# Patient Record
Sex: FEMALE | Race: WHITE | NOT HISPANIC OR LATINO | Employment: PART TIME | URBAN - METROPOLITAN AREA
[De-identification: names, ages, dates, MRNs, and addresses within clinical notes are randomized per-mention and may not be internally consistent; named-entity substitution may affect disease eponyms.]

---

## 2022-04-07 ENCOUNTER — HISTORICAL (OUTPATIENT)
Dept: ADMINISTRATIVE | Facility: HOSPITAL | Age: 62
End: 2022-04-07

## 2022-04-24 VITALS
SYSTOLIC BLOOD PRESSURE: 114 MMHG | HEIGHT: 66 IN | DIASTOLIC BLOOD PRESSURE: 80 MMHG | BODY MASS INDEX: 28.16 KG/M2 | OXYGEN SATURATION: 100 % | WEIGHT: 175.25 LBS

## 2022-05-11 ENCOUNTER — TELEPHONE (OUTPATIENT)
Dept: GYNECOLOGIC ONCOLOGY | Facility: CLINIC | Age: 62
End: 2022-05-11
Payer: MEDICAID

## 2022-05-11 DIAGNOSIS — N94.12 DEEP DYSPAREUNIA IN FEMALE: ICD-10-CM

## 2022-05-11 DIAGNOSIS — L90.0 LICHEN SCLEROSUS ET ATROPHICUS: Primary | ICD-10-CM

## 2022-05-11 NOTE — NURSING
New referral received from Dr aries Sandoval to rule out vulvar cancer in a patient with Lichen sclerosus et atrophicus. Pt called and name and  verified. Explained my role as nurse navigator and direct number provided. Pt scheduled to see Dr Kelly in the next available [place] appointment. Patient encouraged to call for any questions or concerns about her care or appointments. Pt verbalized understanding. Date time and location of appointment reviewed with pt. Appointment letter mailed to pt.     Oncology Navigation   Intake  Cancer Type: Gynecologic (Lichen Sclerosus et atrophicus, rule out vulvar cancer)  Internal / External Referral: External  Referral Source: Dr aries Sandoval  Date of Referral: 5/10/2022  Initial Nurse Navigator Contact: 2022  Referral to Initial Contact Timeline (days): 1  Date Worked: 2022  First Appointment Available: 2022  Appointment Date: 2022  First Available Date vs. Scheduled Date (days): 26  Reason if booked > 7 days after scheduling: Other  Other reason booked > 7 days: pt desires to be seen in [place]. pt scheudled in next available [place] appointment.     Treatment             Acuity      Follow Up  No follow-ups on file.

## 2022-06-06 ENCOUNTER — OFFICE VISIT (OUTPATIENT)
Dept: GYNECOLOGIC ONCOLOGY | Facility: CLINIC | Age: 62
End: 2022-06-06
Payer: MEDICAID

## 2022-06-06 VITALS
HEIGHT: 65 IN | DIASTOLIC BLOOD PRESSURE: 75 MMHG | HEART RATE: 86 BPM | WEIGHT: 184.31 LBS | BODY MASS INDEX: 30.71 KG/M2 | SYSTOLIC BLOOD PRESSURE: 131 MMHG

## 2022-06-06 DIAGNOSIS — N94.12 DEEP DYSPAREUNIA IN FEMALE: ICD-10-CM

## 2022-06-06 DIAGNOSIS — L90.0 LICHEN SCLEROSUS ET ATROPHICUS: ICD-10-CM

## 2022-06-06 PROCEDURE — 99204 PR OFFICE/OUTPT VISIT, NEW, LEVL IV, 45-59 MIN: ICD-10-PCS | Mod: S$GLB,,, | Performed by: OBSTETRICS & GYNECOLOGY

## 2022-06-06 PROCEDURE — 1159F PR MEDICATION LIST DOCUMENTED IN MEDICAL RECORD: ICD-10-PCS | Mod: CPTII,S$GLB,, | Performed by: OBSTETRICS & GYNECOLOGY

## 2022-06-06 PROCEDURE — 3078F PR MOST RECENT DIASTOLIC BLOOD PRESSURE < 80 MM HG: ICD-10-PCS | Mod: CPTII,S$GLB,, | Performed by: OBSTETRICS & GYNECOLOGY

## 2022-06-06 PROCEDURE — 3075F SYST BP GE 130 - 139MM HG: CPT | Mod: CPTII,S$GLB,, | Performed by: OBSTETRICS & GYNECOLOGY

## 2022-06-06 PROCEDURE — 3078F DIAST BP <80 MM HG: CPT | Mod: CPTII,S$GLB,, | Performed by: OBSTETRICS & GYNECOLOGY

## 2022-06-06 PROCEDURE — 3008F PR BODY MASS INDEX (BMI) DOCUMENTED: ICD-10-PCS | Mod: CPTII,S$GLB,, | Performed by: OBSTETRICS & GYNECOLOGY

## 2022-06-06 PROCEDURE — 1159F MED LIST DOCD IN RCRD: CPT | Mod: CPTII,S$GLB,, | Performed by: OBSTETRICS & GYNECOLOGY

## 2022-06-06 PROCEDURE — 3075F PR MOST RECENT SYSTOLIC BLOOD PRESS GE 130-139MM HG: ICD-10-PCS | Mod: CPTII,S$GLB,, | Performed by: OBSTETRICS & GYNECOLOGY

## 2022-06-06 PROCEDURE — 3008F BODY MASS INDEX DOCD: CPT | Mod: CPTII,S$GLB,, | Performed by: OBSTETRICS & GYNECOLOGY

## 2022-06-06 PROCEDURE — 99204 OFFICE O/P NEW MOD 45 MIN: CPT | Mod: S$GLB,,, | Performed by: OBSTETRICS & GYNECOLOGY

## 2022-06-06 RX ORDER — IBUPROFEN 800 MG/1
800 TABLET ORAL 3 TIMES DAILY
COMMUNITY

## 2022-06-06 RX ORDER — CLOBETASOL PROPIONATE 0.5 MG/G
OINTMENT TOPICAL
Qty: 30 G | Refills: 3 | Status: SHIPPED | OUTPATIENT
Start: 2022-06-06

## 2022-06-06 RX ORDER — CLOBETASOL PROPIONATE 0.5 MG/G
OINTMENT TOPICAL
Qty: 30 G | Refills: 3 | Status: CANCELLED | OUTPATIENT
Start: 2022-06-06

## 2022-06-06 NOTE — PROGRESS NOTES
"REFERRING PROVIDER  Emeka Sandoval MD     HISTORY OF PRESENT CONDITION  CC: Lichen sclerosis  Mari Galvan is a 62 y.o.  with severe lichen sclerosus.  She states that it has gotten worse over the past year with paresthesias (pins and needles) and pruritis.  She also feels like her vagina is closing off, unable to have intercourse since her laser treatment 10/20/2021.  She has a h/o LORI/BSO.     2022 - Received records that indicate patient seen 2020 for annual exam with concern for "itching and odor - no relief with monistat; intense itching at labia".  She was treated with twice daily clobetasol for 2 weeks and returned for biopsy detailed below.  Last visit note from 10/2/2020 when she was directed to continue clobetasol and estrace.    DATA REVIEWED  2020 Vulvar Biopsy:  Lichenoid dermatitis with features worrisome for lichen sclerosis et atrophicus    2022  - 4;  CEA - 0.9    Past Medical History:   Diagnosis Date    Thyroid disease       Current Outpatient Medications   Medication Sig    ibuprofen (ADVIL,MOTRIN) 800 MG tablet Take 800 mg by mouth 3 (three) times daily.    clobetasol 0.05% (TEMOVATE) 0.05 % Oint Apply  1/2 fingertip (measured from fingertip to first crease below the fingertip) in a thin layer over the affected area.  Application at night for four weeks, then every other night for four weeks, then twice weekly for four weeks.     No current facility-administered medications for this visit.     Review of patient's allergies indicates:   Allergen Reactions    Prednisone      Jitters    Shellfish derived Itching     Other reaction(s): Rash       Past Surgical History:   Procedure Laterality Date    CHOLECYSTECTOMY      HYSTERECTOMY      Vulvar laser          FAMILY HISTORY  Family History   Problem Relation Age of Onset    Heart disease Father     Cancer Mother      SOCIAL HISTORY    reports that she has been smoking cigarettes. She has been smoking " about 0.50 packs per day. She has never used smokeless tobacco. She reports previous drug use. Drug: Marijuana. She reports that she does not drink alcohol.    OBJECTIVE   Vitals:    06/06/22 1453   BP: 131/75   Pulse: 86      Body mass index is 30.67 kg/m².     Physical Exam:   Constitutional: She is oriented to person, place, and time. She appears well-developed and well-nourished. No distress.    HENT:   Head: Normocephalic and atraumatic.    Eyes: Conjunctivae and EOM are normal.      Pulmonary/Chest: Effort normal. No respiratory distress.        Abdominal: Soft.     Genitourinary:                     Neurological: She is alert and oriented to person, place, and time.    Skin: No rash noted.    Psychiatric: She has a normal mood and affect. Her behavior is normal. Judgment normal.     ECOG status: 0    LABORATORY DATA  Lab data reviewed.    RADIOLOGICAL DATA  Radiology data reviewed.    PATHOLOGY DATA  Pathology data reviewed.    ASSESSMENT    1. Lichen sclerosus et atrophicus    2. Deep dyspareunia in female      Per Ms. Galvan's report, she has a biopsy proven diagnosis of lichen sclerosis for which we are trying to get documentation.  I started her on a high dose steroid treatment plan with Clobetasol Propionate 0.05% Ointment (30 gram tube).  She will apply ½ fingertip (measured from fingertip to first crease below the fingertip) in a thin layer over the affected area.  Application at night for four weeks, then every other night for four weeks, then twice weekly for four weeks.  If treatment successful, she will begin maintenance treatment applying ointment 2 nights per week as tolerated and as needed for symptom control.    She will follow-up in 1 month to insure improvement.  If improving, continue treatment plan as above, if not, schedule for biopsies in OR in August.       PLAN  1.  Plan as outlined above  2.  Follow-up in 1 months to insure improvement      Gregory Kelly MD

## 2022-07-29 ENCOUNTER — TELEPHONE (OUTPATIENT)
Dept: GYNECOLOGIC ONCOLOGY | Facility: CLINIC | Age: 62
End: 2022-07-29
Payer: MEDICAID

## 2022-07-29 NOTE — TELEPHONE ENCOUNTER
Pt contacted and LVM explaining that Dr Kelly will unexpectedly be out of the office and not seeing patients on Monday. Navigator requested a return call to adjust pts appointment.

## 2022-08-01 NOTE — TELEPHONE ENCOUNTER
Pt returned call and scheduled for follow up visit on Wednesday August 3rd at the [facility]. Date, time and location reviewed with pt.

## 2022-08-03 ENCOUNTER — OFFICE VISIT (OUTPATIENT)
Dept: GYNECOLOGIC ONCOLOGY | Facility: CLINIC | Age: 62
End: 2022-08-03
Payer: MEDICAID

## 2022-08-03 VITALS
SYSTOLIC BLOOD PRESSURE: 130 MMHG | WEIGHT: 188.25 LBS | BODY MASS INDEX: 31.36 KG/M2 | HEART RATE: 78 BPM | DIASTOLIC BLOOD PRESSURE: 72 MMHG | HEIGHT: 65 IN

## 2022-08-03 DIAGNOSIS — N94.12 DEEP DYSPAREUNIA IN FEMALE: ICD-10-CM

## 2022-08-03 DIAGNOSIS — L90.0 LICHEN SCLEROSUS ET ATROPHICUS: Primary | ICD-10-CM

## 2022-08-03 PROCEDURE — 3078F DIAST BP <80 MM HG: CPT | Mod: CPTII,S$GLB,, | Performed by: OBSTETRICS & GYNECOLOGY

## 2022-08-03 PROCEDURE — 99214 OFFICE O/P EST MOD 30 MIN: CPT | Mod: S$GLB,,, | Performed by: OBSTETRICS & GYNECOLOGY

## 2022-08-03 PROCEDURE — 3008F PR BODY MASS INDEX (BMI) DOCUMENTED: ICD-10-PCS | Mod: CPTII,S$GLB,, | Performed by: OBSTETRICS & GYNECOLOGY

## 2022-08-03 PROCEDURE — 99214 PR OFFICE/OUTPT VISIT, EST, LEVL IV, 30-39 MIN: ICD-10-PCS | Mod: S$GLB,,, | Performed by: OBSTETRICS & GYNECOLOGY

## 2022-08-03 PROCEDURE — 3075F PR MOST RECENT SYSTOLIC BLOOD PRESS GE 130-139MM HG: ICD-10-PCS | Mod: CPTII,S$GLB,, | Performed by: OBSTETRICS & GYNECOLOGY

## 2022-08-03 PROCEDURE — 3008F BODY MASS INDEX DOCD: CPT | Mod: CPTII,S$GLB,, | Performed by: OBSTETRICS & GYNECOLOGY

## 2022-08-03 PROCEDURE — 1159F MED LIST DOCD IN RCRD: CPT | Mod: CPTII,S$GLB,, | Performed by: OBSTETRICS & GYNECOLOGY

## 2022-08-03 PROCEDURE — 3075F SYST BP GE 130 - 139MM HG: CPT | Mod: CPTII,S$GLB,, | Performed by: OBSTETRICS & GYNECOLOGY

## 2022-08-03 PROCEDURE — 1159F PR MEDICATION LIST DOCUMENTED IN MEDICAL RECORD: ICD-10-PCS | Mod: CPTII,S$GLB,, | Performed by: OBSTETRICS & GYNECOLOGY

## 2022-08-03 PROCEDURE — 3078F PR MOST RECENT DIASTOLIC BLOOD PRESSURE < 80 MM HG: ICD-10-PCS | Mod: CPTII,S$GLB,, | Performed by: OBSTETRICS & GYNECOLOGY

## 2022-08-04 NOTE — PROGRESS NOTES
"REFERRING PROVIDER  Emeka Sandoval MD     INTERVAL HISTORY  CC: Lichen Sclerosis Follow-up  Mari Galvan is a 62 y.o.  who presents for 2 month follow-up of High Potency Steroid therapy of Lichen Sclerosis.  She reports that Temovate is working very well, puritis has largely resolved and nearly all thick white tissue has resolved. She is considering intercourse again but has trouble with penetration.  This is not a priority for her right now but possibly in the future.  She is using Temovate twice weekly now.    HPI or ONCOLOGIC HISTORY  Referred with with severe lichen sclerosus.  She states that it has gotten worse over the past year with paresthesias (pins and needles) and pruritis.  She also feels like her vagina is closing off, unable to have intercourse since her laser treatment 10/20/2021.  She has a h/o LORI/BSO.      2022 - Received records that indicate patient seen 2020 for annual exam with concern for "itching and odor - no relief with monistat; intense itching at labia".  She was treated with twice daily clobetasol for 2 weeks and returned for biopsy detailed below.  Last visit note from 10/2/2020 when she was directed to continue clobetasol and estrace.     DATA REVIEWED  2020 Vulvar Biopsy:  Lichenoid dermatitis with features worrisome for lichen sclerosis et atrophicus     2022  - 4;  CEA - 0.9    OBJECTIVE   Vitals:    22 1508   BP: 130/72   Pulse: 78      Body mass index is 31.33 kg/m².     Physical Exam:   Constitutional: She is oriented to person, place, and time. She appears well-developed and well-nourished. No distress.    HENT:   Head: Normocephalic and atraumatic.    Eyes: Conjunctivae and EOM are normal.      Pulmonary/Chest: Effort normal. No respiratory distress.        Abdominal: Soft. She exhibits no distension and no mass. There is no abdominal tenderness. There is no rebound and no guarding. No hernia.     Genitourinary:                   "   Neurological: She is alert and oriented to person, place, and time.    Skin: No rash noted.    Psychiatric: She has a normal mood and affect. Her behavior is normal.     ECOG status: 0    LABORATORY DATA  Lab data reviewed.    RADIOLOGICAL DATA  Radiology data reviewed.    PATHOLOGY DATA  Pathology data reviewed.    ASSESSMENT    1. Lichen sclerosus et atrophicus    2. Deep dyspareunia in female    MUCH improved.  She will follow-up with me in 2 months and continue twice weekly Temovate.      We discussed vaginal dilators and procuring them from internet     PLAN  1.  Continue twice weekly Temovate  2.  Follow-up in 2 months  3.  Explore regional pelvic floor PT resources       Gregory Kelly MD

## 2022-08-11 ENCOUNTER — TELEPHONE (OUTPATIENT)
Dept: GYNECOLOGIC ONCOLOGY | Facility: CLINIC | Age: 62
End: 2022-08-11
Payer: MEDICAID

## 2022-08-11 NOTE — TELEPHONE ENCOUNTER
Referral to [facility] in [place] faxed to [phone] and confirmation received at 317pm.    Pt called and updated that referral for pelvic floor therapy was sent to [facility] in [place] for her to explore if she is interested in having pelvic floor therapy. Pt verbalized understanding.

## 2024-05-24 ENCOUNTER — TELEPHONE (OUTPATIENT)
Dept: GYNECOLOGIC ONCOLOGY | Facility: CLINIC | Age: 64
End: 2024-05-24
Payer: MEDICAID

## 2024-05-24 NOTE — TELEPHONE ENCOUNTER
"Pt contacted navigator with update/request that she would like a new refill on her clobetasol.    Pt last seen by Dr Kelly on 8/3/2022, the plan at the time was:  PLAN  1.  Continue twice weekly Temovate  2.  Follow-up in 2 months  3.  Explore regional pelvic floor PT resources     Pt updated navigator that she did not schedule a follow up with Robin or pursue the referral to [facility] in [place] that was sent. Pt did report that she has been using the Temovate twice a day. Pt also reports using neosporin with lidocaine. But pt states she knows that her Lichen sclerosus will not be cured and she will always have to manage it. But pt is uncomfortable and is concerned that it is getting worse. Pt reports burning, pain and pins and needles in the area when urinating. Pt denies seeing any GYN provider since her last visit with Dr Kelly. Pt urged to arrange a follow up visit on June 4th or 5th when Dr Kelly is in town. Pt states "I need to look at my calendar as I am caring for someone 24 hours a day and he involves constant care". Pt will call navigator back if she is able to come on the 4th or 5th. Navigator will send refill request to Dr Kelly and Dana SUE.  "

## 2024-05-27 DIAGNOSIS — L90.0 LICHEN SCLEROSUS ET ATROPHICUS: ICD-10-CM

## 2024-05-27 RX ORDER — CLOBETASOL PROPIONATE 0.5 MG/G
OINTMENT TOPICAL
Qty: 30 G | Refills: 3 | Status: SHIPPED | OUTPATIENT
Start: 2024-05-27

## 2024-05-30 NOTE — PROGRESS NOTES
"REFERRING PROVIDER  Emeka Sandoval MD     INTERVAL HISTORY  CC: Lichen Sclerosis Follow-up    Mari Galvan is a 64 y.o.  who I treated last year for lichen sclerosis.  Her intitial consultation was  2022.  At that time, I started her on a 3 month treatment plan of high potency steroid.  She followed up 8/3/2022 with dramatic improvement, but did not follow-up again after that.  She returns today stating that she has had several month worsening of her vulvar symptoms that are refractory steroid ointment (which has become very painful to apply). She is taking tylenol regularly for pain.    HPI or ONCOLOGIC HISTORY  Referred with with severe lichen sclerosus.  She states that it got worse from  to  with paresthesias (pins and needles) and pruritis.  She also felt like her vagina was closing off, unable to have intercourse since her laser treatment 10/20/2021.  She has a h/o LORI/BSO.      2022 - Received records that indicate patient seen 2020 for annual exam with concern for "itching and odor - no relief with monistat; intense itching at labia".  She was treated with twice daily clobetasol for 2 weeks and returned for biopsy detailed below.  Last visit note from 10/2/2020 when she was directed to continue clobetasol and estrace.    Data Reviewed   2020 Vulvar Biopsy:  Lichenoid dermatitis with features worrisome for lichen sclerosis et atrophicus     2022  - 4;  CEA - 0.9    OBJECTIVE   Vitals:    24 1131   BP: 128/85   Pulse: 71        Body mass index is 32.02 kg/m².     Physical Exam:   Constitutional: She is oriented to person, place, and time. She appears well-developed and well-nourished. No distress.    HENT:   Head: Normocephalic and atraumatic.    Eyes: Conjunctivae and EOM are normal.      Pulmonary/Chest: Effort normal. No respiratory distress.        Abdominal: Soft. She exhibits no distension and no mass. There is no abdominal tenderness. There is no " rebound and no guarding. No hernia.     Genitourinary:                     Neurological: She is alert and oriented to person, place, and time.    Skin: No rash noted.    Psychiatric: She has a normal mood and affect. Her behavior is normal.     ECOG status: 0    LABORATORY DATA  Lab data reviewed.    RADIOLOGICAL DATA  Radiology data reviewed.    PATHOLOGY DATA  Pathology data reviewed.    ASSESSMENT    1. Lichen sclerosus et atrophicus    2. Pre-op testing    Significant deterioration of vulvar changes in patient with known lichen sclerosis. She need mapping biopsies to rule out development of squamous cell carcinoma.    I discussed extensively the risks, benefits, alternatives, and indications of the planned wide-local excision / partial vulvectomy to include the risk of poor wound healing and for some women, it may take several weeks for the wound to heal.  Other risks include both perioperative and chronic pain, change in sensation, scarring, change in appearance, infection, and bleeding.  She expresses understanding, was given an opportunity to ask any questions, and now she desires to proceed with planned procedure.  Informed consent was signed.       PLAN  Schedule EUA and biopsies (likely July 10th)   Continue daily Temovate        Pelvic exam was done requiring a female chaperone

## 2024-06-05 ENCOUNTER — OFFICE VISIT (OUTPATIENT)
Dept: GYNECOLOGIC ONCOLOGY | Facility: CLINIC | Age: 64
End: 2024-06-05
Payer: MEDICAID

## 2024-06-05 VITALS
SYSTOLIC BLOOD PRESSURE: 128 MMHG | BODY MASS INDEX: 31.89 KG/M2 | HEIGHT: 66 IN | HEART RATE: 71 BPM | DIASTOLIC BLOOD PRESSURE: 85 MMHG | WEIGHT: 198.44 LBS

## 2024-06-05 DIAGNOSIS — L90.0 LICHEN SCLEROSUS ET ATROPHICUS: Primary | ICD-10-CM

## 2024-06-05 PROCEDURE — 99215 OFFICE O/P EST HI 40 MIN: CPT | Mod: S$GLB,,, | Performed by: OBSTETRICS & GYNECOLOGY

## 2024-06-05 PROCEDURE — 1159F MED LIST DOCD IN RCRD: CPT | Mod: CPTII,S$GLB,, | Performed by: OBSTETRICS & GYNECOLOGY

## 2024-06-05 PROCEDURE — 99459 PELVIC EXAMINATION: CPT | Mod: S$GLB,,, | Performed by: OBSTETRICS & GYNECOLOGY

## 2024-06-05 PROCEDURE — 3074F SYST BP LT 130 MM HG: CPT | Mod: CPTII,S$GLB,, | Performed by: OBSTETRICS & GYNECOLOGY

## 2024-06-05 PROCEDURE — 3079F DIAST BP 80-89 MM HG: CPT | Mod: CPTII,S$GLB,, | Performed by: OBSTETRICS & GYNECOLOGY

## 2024-06-05 PROCEDURE — 3008F BODY MASS INDEX DOCD: CPT | Mod: CPTII,S$GLB,, | Performed by: OBSTETRICS & GYNECOLOGY

## 2024-06-05 RX ORDER — LIDOCAINE HYDROCHLORIDE 20 MG/ML
JELLY TOPICAL
Qty: 30 ML | Refills: 1 | Status: SHIPPED | OUTPATIENT
Start: 2024-06-05 | End: 2025-06-05

## 2024-06-12 DIAGNOSIS — L90.0 LICHEN SCLEROSUS ET ATROPHICUS: ICD-10-CM

## 2024-06-12 RX ORDER — LIDOCAINE HYDROCHLORIDE 20 MG/ML
JELLY TOPICAL
Qty: 30 ML | Refills: 1 | Status: CANCELLED | OUTPATIENT
Start: 2024-06-12 | End: 2025-06-12

## 2024-06-20 ENCOUNTER — TELEPHONE (OUTPATIENT)
Dept: GYNECOLOGIC ONCOLOGY | Facility: CLINIC | Age: 64
End: 2024-06-20
Payer: MEDICAID

## 2024-06-28 ENCOUNTER — ANESTHESIA EVENT (OUTPATIENT)
Dept: SURGERY | Facility: HOSPITAL | Age: 64
End: 2024-06-28
Payer: MEDICAID

## 2024-07-08 ENCOUNTER — LAB VISIT (OUTPATIENT)
Dept: LAB | Facility: HOSPITAL | Age: 64
End: 2024-07-08
Attending: OBSTETRICS & GYNECOLOGY
Payer: MEDICAID

## 2024-07-08 DIAGNOSIS — L90.0 LICHEN SCLEROSUS ET ATROPHICUS: ICD-10-CM

## 2024-07-08 LAB
ALBUMIN SERPL-MCNC: 3.9 G/DL (ref 3.4–4.8)
ALBUMIN/GLOB SERPL: 1.1 RATIO (ref 1.1–2)
ALP SERPL-CCNC: 100 UNIT/L (ref 40–150)
ALT SERPL-CCNC: 17 UNIT/L (ref 0–55)
ANION GAP SERPL CALC-SCNC: 11 MEQ/L
AST SERPL-CCNC: 16 UNIT/L (ref 5–34)
BASOPHILS # BLD AUTO: 0.05 X10(3)/MCL
BASOPHILS NFR BLD AUTO: 0.9 %
BILIRUB SERPL-MCNC: 0.6 MG/DL
BUN SERPL-MCNC: 15.1 MG/DL (ref 9.8–20.1)
CALCIUM SERPL-MCNC: 9.7 MG/DL (ref 8.4–10.2)
CHLORIDE SERPL-SCNC: 105 MMOL/L (ref 98–107)
CO2 SERPL-SCNC: 26 MMOL/L (ref 23–31)
CREAT SERPL-MCNC: 1.09 MG/DL (ref 0.55–1.02)
CREAT/UREA NIT SERPL: 14
EOSINOPHIL # BLD AUTO: 0.11 X10(3)/MCL (ref 0–0.9)
EOSINOPHIL NFR BLD AUTO: 2 %
ERYTHROCYTE [DISTWIDTH] IN BLOOD BY AUTOMATED COUNT: 12.8 % (ref 11.5–17)
GFR SERPLBLD CREATININE-BSD FMLA CKD-EPI: 58 ML/MIN/1.73/M2
GLOBULIN SER-MCNC: 3.7 GM/DL (ref 2.4–3.5)
GLUCOSE SERPL-MCNC: 88 MG/DL (ref 82–115)
HCT VFR BLD AUTO: 39.7 % (ref 37–47)
HGB BLD-MCNC: 13.4 G/DL (ref 12–16)
IMM GRANULOCYTES # BLD AUTO: 0.03 X10(3)/MCL (ref 0–0.04)
IMM GRANULOCYTES NFR BLD AUTO: 0.6 %
LYMPHOCYTES # BLD AUTO: 1.75 X10(3)/MCL (ref 0.6–4.6)
LYMPHOCYTES NFR BLD AUTO: 32.2 %
MCH RBC QN AUTO: 34.4 PG (ref 27–31)
MCHC RBC AUTO-ENTMCNC: 33.8 G/DL (ref 33–36)
MCV RBC AUTO: 102.1 FL (ref 80–94)
MONOCYTES # BLD AUTO: 0.49 X10(3)/MCL (ref 0.1–1.3)
MONOCYTES NFR BLD AUTO: 9 %
NEUTROPHILS # BLD AUTO: 3.01 X10(3)/MCL (ref 2.1–9.2)
NEUTROPHILS NFR BLD AUTO: 55.3 %
NRBC BLD AUTO-RTO: 0 %
OHS QRS DURATION: 76 MS
OHS QTC CALCULATION: 388 MS
PLATELET # BLD AUTO: 326 X10(3)/MCL (ref 130–400)
PMV BLD AUTO: 9.2 FL (ref 7.4–10.4)
POTASSIUM SERPL-SCNC: 4.5 MMOL/L (ref 3.5–5.1)
PROT SERPL-MCNC: 7.6 GM/DL (ref 5.8–7.6)
RBC # BLD AUTO: 3.89 X10(6)/MCL (ref 4.2–5.4)
SODIUM SERPL-SCNC: 142 MMOL/L (ref 136–145)
WBC # BLD AUTO: 5.44 X10(3)/MCL (ref 4.5–11.5)

## 2024-07-08 PROCEDURE — 93010 ELECTROCARDIOGRAM REPORT: CPT | Mod: ,,, | Performed by: INTERNAL MEDICINE

## 2024-07-08 PROCEDURE — 80053 COMPREHEN METABOLIC PANEL: CPT

## 2024-07-08 PROCEDURE — 93005 ELECTROCARDIOGRAM TRACING: CPT

## 2024-07-08 PROCEDURE — 36415 COLL VENOUS BLD VENIPUNCTURE: CPT

## 2024-07-08 PROCEDURE — 85025 COMPLETE CBC W/AUTO DIFF WBC: CPT

## 2024-07-08 NOTE — H&P
"REFERRING PROVIDER  Emeka Sandoval MD     INTERVAL HISTORY  CC: Lichen Sclerosis Follow-up    Mari Galvan is a 64 y.o.  who I treated last year for lichen sclerosis.  Her intitial consultation was  2022.  At that time, I started her on a 3 month treatment plan of high potency steroid.  She followed up 8/3/2022 with dramatic improvement, but did not follow-up again after that.  She returns today stating that she has had several month worsening of her vulvar symptoms that are refractory steroid ointment (which has become very painful to apply). She is taking tylenol regularly for pain.    HPI or ONCOLOGIC HISTORY  Referred with with severe lichen sclerosus.  She states that it got worse from  to  with paresthesias (pins and needles) and pruritis.  She also felt like her vagina was closing off, unable to have intercourse since her laser treatment 10/20/2021.  She has a h/o LORI/BSO.      2022 - Received records that indicate patient seen 2020 for annual exam with concern for "itching and odor - no relief with monistat; intense itching at labia".  She was treated with twice daily clobetasol for 2 weeks and returned for biopsy detailed below.  Last visit note from 10/2/2020 when she was directed to continue clobetasol and estrace.    Data Reviewed   2020 Vulvar Biopsy:  Lichenoid dermatitis with features worrisome for lichen sclerosis et atrophicus     2022  - 4;  CEA - 0.9    OBJECTIVE   There were no vitals filed for this visit.       Body mass index is 30.51 kg/m².     Physical Exam:   Constitutional: She is oriented to person, place, and time. She appears well-developed and well-nourished. No distress.    HENT:   Head: Normocephalic and atraumatic.    Eyes: Conjunctivae and EOM are normal.      Pulmonary/Chest: Effort normal. No respiratory distress.        Abdominal: Soft. She exhibits no distension and no mass. There is no abdominal tenderness. There is no rebound " and no guarding. No hernia.     Genitourinary:                     Neurological: She is alert and oriented to person, place, and time.    Skin: No rash noted.    Psychiatric: She has a normal mood and affect. Her behavior is normal.     ECOG status: 0    LABORATORY DATA  Lab data reviewed.    RADIOLOGICAL DATA  Radiology data reviewed.    PATHOLOGY DATA  Pathology data reviewed.    ASSESSMENT    No diagnosis found.  Significant deterioration of vulvar changes in patient with known lichen sclerosis. She need mapping biopsies to rule out development of squamous cell carcinoma.    I discussed extensively the risks, benefits, alternatives, and indications of the planned wide-local excision / partial vulvectomy to include the risk of poor wound healing and for some women, it may take several weeks for the wound to heal.  Other risks include both perioperative and chronic pain, change in sensation, scarring, change in appearance, infection, and bleeding.  She expresses understanding, was given an opportunity to ask any questions, and now she desires to proceed with planned procedure.  Informed consent was signed.       PLAN  Schedule EUA and biopsies (likely July 10th)   Continue daily Temovate        Pelvic exam was done requiring a female chaperone

## 2024-07-09 RX ORDER — GLUCAGON 1 MG
1 KIT INJECTION
OUTPATIENT
Start: 2024-07-09

## 2024-07-09 RX ORDER — LIDOCAINE HYDROCHLORIDE 10 MG/ML
1 INJECTION, SOLUTION EPIDURAL; INFILTRATION; INTRACAUDAL; PERINEURAL ONCE
Status: CANCELLED | OUTPATIENT
Start: 2024-07-09 | End: 2024-07-09

## 2024-07-10 ENCOUNTER — HOSPITAL ENCOUNTER (OUTPATIENT)
Facility: HOSPITAL | Age: 64
Discharge: HOME | End: 2024-07-10
Attending: OBSTETRICS & GYNECOLOGY | Admitting: OBSTETRICS & GYNECOLOGY
Payer: MEDICAID

## 2024-07-10 ENCOUNTER — ANESTHESIA (OUTPATIENT)
Dept: SURGERY | Facility: HOSPITAL | Age: 64
End: 2024-07-10
Payer: MEDICAID

## 2024-07-10 DIAGNOSIS — L90.0 LICHEN SCLEROSUS ET ATROPHICUS: ICD-10-CM

## 2024-07-10 DIAGNOSIS — N90.89 VULVAR LESION: Primary | ICD-10-CM

## 2024-07-10 PROCEDURE — 37000008 HC ANESTHESIA 1ST 15 MINUTES: Performed by: OBSTETRICS & GYNECOLOGY

## 2024-07-10 PROCEDURE — 63600175 PHARM REV CODE 636 W HCPCS: Performed by: NURSE ANESTHETIST, CERTIFIED REGISTERED

## 2024-07-10 PROCEDURE — 56606 BIOPSY OF VULVA/PERINEUM: CPT | Mod: ,,, | Performed by: OBSTETRICS & GYNECOLOGY

## 2024-07-10 PROCEDURE — 25000003 PHARM REV CODE 250: Performed by: NURSE ANESTHETIST, CERTIFIED REGISTERED

## 2024-07-10 PROCEDURE — 71000015 HC POSTOP RECOV 1ST HR: Performed by: OBSTETRICS & GYNECOLOGY

## 2024-07-10 PROCEDURE — 36000706: Performed by: OBSTETRICS & GYNECOLOGY

## 2024-07-10 PROCEDURE — 88305 TISSUE EXAM BY PATHOLOGIST: CPT | Performed by: OBSTETRICS & GYNECOLOGY

## 2024-07-10 PROCEDURE — 25000242 PHARM REV CODE 250 ALT 637 W/ HCPCS

## 2024-07-10 PROCEDURE — 71000016 HC POSTOP RECOV ADDL HR: Performed by: OBSTETRICS & GYNECOLOGY

## 2024-07-10 PROCEDURE — 25000003 PHARM REV CODE 250

## 2024-07-10 PROCEDURE — 56605 BIOPSY OF VULVA/PERINEUM: CPT | Mod: ,,, | Performed by: OBSTETRICS & GYNECOLOGY

## 2024-07-10 PROCEDURE — 63600175 PHARM REV CODE 636 W HCPCS: Performed by: ANESTHESIOLOGY

## 2024-07-10 PROCEDURE — 37000009 HC ANESTHESIA EA ADD 15 MINS: Performed by: OBSTETRICS & GYNECOLOGY

## 2024-07-10 PROCEDURE — 25000003 PHARM REV CODE 250: Performed by: OBSTETRICS & GYNECOLOGY

## 2024-07-10 PROCEDURE — 25000003 PHARM REV CODE 250: Performed by: ANESTHESIOLOGY

## 2024-07-10 PROCEDURE — 36000707: Performed by: OBSTETRICS & GYNECOLOGY

## 2024-07-10 PROCEDURE — 71000033 HC RECOVERY, INTIAL HOUR: Performed by: OBSTETRICS & GYNECOLOGY

## 2024-07-10 RX ORDER — ONDANSETRON 4 MG/1
8 TABLET, ORALLY DISINTEGRATING ORAL EVERY 8 HOURS PRN
Status: DISCONTINUED | OUTPATIENT
Start: 2024-07-10 | End: 2024-07-10 | Stop reason: HOSPADM

## 2024-07-10 RX ORDER — MIDAZOLAM HYDROCHLORIDE 1 MG/ML
INJECTION INTRAMUSCULAR; INTRAVENOUS
Status: DISCONTINUED | OUTPATIENT
Start: 2024-07-10 | End: 2024-07-10

## 2024-07-10 RX ORDER — HYDROMORPHONE HYDROCHLORIDE 2 MG/ML
0.5 INJECTION, SOLUTION INTRAMUSCULAR; INTRAVENOUS; SUBCUTANEOUS
Status: DISCONTINUED | OUTPATIENT
Start: 2024-07-10 | End: 2024-07-10 | Stop reason: HOSPADM

## 2024-07-10 RX ORDER — MUPIROCIN 20 MG/G
OINTMENT TOPICAL
Status: DISCONTINUED | OUTPATIENT
Start: 2024-07-10 | End: 2024-07-10 | Stop reason: HOSPADM

## 2024-07-10 RX ORDER — ONDANSETRON 4 MG/1
4 TABLET, ORALLY DISINTEGRATING ORAL ONCE
Status: COMPLETED | OUTPATIENT
Start: 2024-07-10 | End: 2024-07-10

## 2024-07-10 RX ORDER — IPRATROPIUM BROMIDE AND ALBUTEROL SULFATE 2.5; .5 MG/3ML; MG/3ML
SOLUTION RESPIRATORY (INHALATION)
Status: COMPLETED
Start: 2024-07-10 | End: 2024-07-10

## 2024-07-10 RX ORDER — IBUPROFEN 800 MG/1
800 TABLET ORAL EVERY 6 HOURS PRN
Qty: 30 TABLET | Refills: 2 | OUTPATIENT
Start: 2024-07-10 | End: 2024-07-10

## 2024-07-10 RX ORDER — GABAPENTIN 300 MG/1
300 CAPSULE ORAL
Status: COMPLETED | OUTPATIENT
Start: 2024-07-10 | End: 2024-07-10

## 2024-07-10 RX ORDER — FENTANYL CITRATE 50 UG/ML
INJECTION, SOLUTION INTRAMUSCULAR; INTRAVENOUS
Status: DISCONTINUED | OUTPATIENT
Start: 2024-07-10 | End: 2024-07-10

## 2024-07-10 RX ORDER — ACETAMINOPHEN 500 MG
1000 TABLET ORAL
Status: COMPLETED | OUTPATIENT
Start: 2024-07-10 | End: 2024-07-10

## 2024-07-10 RX ORDER — ONDANSETRON 8 MG/1
8 TABLET, ORALLY DISINTEGRATING ORAL EVERY 8 HOURS PRN
Qty: 12 TABLET | Refills: 0 | Status: SHIPPED | OUTPATIENT
Start: 2024-07-10

## 2024-07-10 RX ORDER — LIDOCAINE HYDROCHLORIDE 10 MG/ML
1 INJECTION, SOLUTION EPIDURAL; INFILTRATION; INTRACAUDAL; PERINEURAL ONCE
Status: DISCONTINUED | OUTPATIENT
Start: 2024-07-10 | End: 2024-07-10 | Stop reason: HOSPADM

## 2024-07-10 RX ORDER — LIDOCAINE HYDROCHLORIDE 20 MG/ML
INJECTION, SOLUTION EPIDURAL; INFILTRATION; INTRACAUDAL; PERINEURAL
Status: DISCONTINUED | OUTPATIENT
Start: 2024-07-10 | End: 2024-07-10

## 2024-07-10 RX ORDER — TRAMADOL HYDROCHLORIDE 50 MG/1
50 TABLET ORAL EVERY 6 HOURS
Qty: 12 TABLET | Refills: 0 | Status: SHIPPED | OUTPATIENT
Start: 2024-07-10 | End: 2024-07-13

## 2024-07-10 RX ORDER — CELECOXIB 200 MG/1
200 CAPSULE ORAL
Status: COMPLETED | OUTPATIENT
Start: 2024-07-10 | End: 2024-07-10

## 2024-07-10 RX ORDER — ACETAMINOPHEN 325 MG/1
650 TABLET ORAL EVERY 4 HOURS PRN
Status: DISCONTINUED | OUTPATIENT
Start: 2024-07-10 | End: 2024-07-10 | Stop reason: HOSPADM

## 2024-07-10 RX ORDER — ACETAMINOPHEN 500 MG
500 TABLET ORAL EVERY 6 HOURS PRN
Qty: 60 TABLET | Refills: 0 | Status: SHIPPED | OUTPATIENT
Start: 2024-07-10

## 2024-07-10 RX ORDER — LIDOCAINE HYDROCHLORIDE AND EPINEPHRINE 10; 10 MG/ML; UG/ML
INJECTION, SOLUTION INFILTRATION; PERINEURAL
Status: DISCONTINUED | OUTPATIENT
Start: 2024-07-10 | End: 2024-07-10 | Stop reason: HOSPADM

## 2024-07-10 RX ORDER — ONDANSETRON HYDROCHLORIDE 2 MG/ML
4 INJECTION, SOLUTION INTRAVENOUS ONCE AS NEEDED
Status: DISCONTINUED | OUTPATIENT
Start: 2024-07-10 | End: 2024-07-10 | Stop reason: HOSPADM

## 2024-07-10 RX ORDER — PROPOFOL 10 MG/ML
VIAL (ML) INTRAVENOUS
Status: DISCONTINUED | OUTPATIENT
Start: 2024-07-10 | End: 2024-07-10

## 2024-07-10 RX ORDER — IPRATROPIUM BROMIDE AND ALBUTEROL SULFATE 2.5; .5 MG/3ML; MG/3ML
3 SOLUTION RESPIRATORY (INHALATION) ONCE
Status: COMPLETED | OUTPATIENT
Start: 2024-07-10 | End: 2024-07-10

## 2024-07-10 RX ORDER — ROCURONIUM BROMIDE 10 MG/ML
INJECTION, SOLUTION INTRAVENOUS
Status: DISCONTINUED | OUTPATIENT
Start: 2024-07-10 | End: 2024-07-10

## 2024-07-10 RX ORDER — SODIUM CHLORIDE, SODIUM LACTATE, POTASSIUM CHLORIDE, CALCIUM CHLORIDE 600; 310; 30; 20 MG/100ML; MG/100ML; MG/100ML; MG/100ML
INJECTION, SOLUTION INTRAVENOUS CONTINUOUS
Status: DISCONTINUED | OUTPATIENT
Start: 2024-07-10 | End: 2024-07-10 | Stop reason: HOSPADM

## 2024-07-10 RX ORDER — ONDANSETRON HYDROCHLORIDE 2 MG/ML
INJECTION, SOLUTION INTRAVENOUS
Status: DISCONTINUED | OUTPATIENT
Start: 2024-07-10 | End: 2024-07-10

## 2024-07-10 RX ORDER — DEXAMETHASONE SODIUM PHOSPHATE 4 MG/ML
INJECTION, SOLUTION INTRA-ARTICULAR; INTRALESIONAL; INTRAMUSCULAR; INTRAVENOUS; SOFT TISSUE
Status: DISCONTINUED | OUTPATIENT
Start: 2024-07-10 | End: 2024-07-10

## 2024-07-10 RX ORDER — HYDROMORPHONE HYDROCHLORIDE 2 MG/ML
0.4 INJECTION, SOLUTION INTRAMUSCULAR; INTRAVENOUS; SUBCUTANEOUS EVERY 5 MIN PRN
Status: DISCONTINUED | OUTPATIENT
Start: 2024-07-10 | End: 2024-07-10 | Stop reason: HOSPADM

## 2024-07-10 RX ORDER — OXYCODONE HYDROCHLORIDE 5 MG/1
5 TABLET ORAL EVERY 4 HOURS PRN
Status: DISCONTINUED | OUTPATIENT
Start: 2024-07-10 | End: 2024-07-10 | Stop reason: HOSPADM

## 2024-07-10 RX ADMIN — CELECOXIB 200 MG: 200 CAPSULE ORAL at 09:07

## 2024-07-10 RX ADMIN — ACETAMINOPHEN 1000 MG: 500 TABLET ORAL at 09:07

## 2024-07-10 RX ADMIN — IPRATROPIUM BROMIDE AND ALBUTEROL SULFATE 3 ML: 2.5; .5 SOLUTION RESPIRATORY (INHALATION) at 02:07

## 2024-07-10 RX ADMIN — FENTANYL CITRATE 100 MCG: 50 INJECTION, SOLUTION INTRAMUSCULAR; INTRAVENOUS at 12:07

## 2024-07-10 RX ADMIN — HYDROMORPHONE HYDROCHLORIDE 0.4 MG: 2 INJECTION, SOLUTION INTRAMUSCULAR; INTRAVENOUS; SUBCUTANEOUS at 02:07

## 2024-07-10 RX ADMIN — SUGAMMADEX 200 MG: 100 INJECTION, SOLUTION INTRAVENOUS at 01:07

## 2024-07-10 RX ADMIN — GABAPENTIN 300 MG: 300 CAPSULE ORAL at 09:07

## 2024-07-10 RX ADMIN — FENTANYL CITRATE 50 MCG: 50 INJECTION, SOLUTION INTRAMUSCULAR; INTRAVENOUS at 01:07

## 2024-07-10 RX ADMIN — SODIUM CHLORIDE, SODIUM GLUCONATE, SODIUM ACETATE, POTASSIUM CHLORIDE AND MAGNESIUM CHLORIDE: 526; 502; 368; 37; 30 INJECTION, SOLUTION INTRAVENOUS at 12:07

## 2024-07-10 RX ADMIN — HYDROMORPHONE HYDROCHLORIDE 0.4 MG: 2 INJECTION, SOLUTION INTRAMUSCULAR; INTRAVENOUS; SUBCUTANEOUS at 01:07

## 2024-07-10 RX ADMIN — LIDOCAINE HYDROCHLORIDE 4 ML: 20 INJECTION, SOLUTION INTRAVENOUS at 12:07

## 2024-07-10 RX ADMIN — MUPIROCIN: 20 OINTMENT TOPICAL at 09:07

## 2024-07-10 RX ADMIN — DEXAMETHASONE SODIUM PHOSPHATE 4 MG: 4 INJECTION, SOLUTION INTRA-ARTICULAR; INTRALESIONAL; INTRAMUSCULAR; INTRAVENOUS; SOFT TISSUE at 12:07

## 2024-07-10 RX ADMIN — PROPOFOL 150 MG: 10 INJECTION, EMULSION INTRAVENOUS at 12:07

## 2024-07-10 RX ADMIN — ONDANSETRON 4 MG: 4 TABLET, ORALLY DISINTEGRATING ORAL at 09:07

## 2024-07-10 RX ADMIN — MIDAZOLAM HYDROCHLORIDE 2 MG: 1 INJECTION, SOLUTION INTRAMUSCULAR; INTRAVENOUS at 12:07

## 2024-07-10 RX ADMIN — ROCURONIUM BROMIDE 50 MG: 10 SOLUTION INTRAVENOUS at 12:07

## 2024-07-10 RX ADMIN — ONDANSETRON 4 MG: 2 INJECTION INTRAMUSCULAR; INTRAVENOUS at 01:07

## 2024-07-10 NOTE — DISCHARGE INSTRUCTIONS
DISCHARGE INSTRUCTIONS:    Discharge Procedure Orders   Diet - regular               Other restrictions (specify):   Order Comments: PELVIC REST:  No douching, tampons, or intercourse for 2 weeks.     DRIVING:  No driving while on narcotics. Driving may be resumed initially with a competent passenger one to two weeks after surgery if no longer taking narcotics.      EXERCISE:  Resume mild to moderate exercise as tolerated             Wound care routine (specify)   Order Comments: WOUND CARE:  If you have a band-aid or bandage on your wound, you may remove it the day after dismissal.  You may wash the wound with mild soap and water.   You may shower at any time but should avoid immersing any abdominal incisions in water for at least two weeks after surgery or until the wound is completely healed. Keep your wound clean and dry.  You should observe your incision for signs of infection which include redness, warmth, drainage or fever.      Call MD for:  temperature >100.4      Call MD for:  persistent nausea and vomiting      Call MD for:  uncontrolled pain      Call MD for:  difficulty breathing, headache or visual disturbances      Call MD for:  redness, tenderness, or signs of infection (pain, swelling, redness, odor or green/yellow discharge around incision site)      Call MD for:  hives            Call MD for:   Order Comments: Inability to void, vaginal bleeding is heavier than a period.     VAGINAL DISCHARGE: You may develop a vaginal discharge and intermittent vaginal spotting after surgery and up to 6 weeks postoperatively.  The discharge may have an odor and may change in color but it is normal.  This is due to dissolving stiches.  Contact your surgical team if you develop vaginal or vulvar irritation along with a discharge.  Also contact your surgical team if you have vaginal discharge that smells like urine or stool.     CONSTIPATION REMEDIES: Patients are often constipated after surgery or with use of oral  narcotic medicine. You should continue to take the stool softener, Senokot-S during the next six weeks, and consume adequate amounts of water.  If you have not had a bowel movement for 3 days after dismissal, or are uncomfortable and unable to pass stool, please try one or all of the following measures:  1.  Milk of Magnesia - 30 cc by mouth every 12 hours   2.  Dulcolax suppository - One suppository per rectum every 4-6 hours   3.  Metamucil, Fibercon or other bulk former - use as directed  4.  Fleets Enema           PAIN MEDICATIONS:      Take your pain medications as instructed (see below). It is best to take pain medications before your pain becomes severe. This will allow you to take less medication yet have better pain relief. For the first 2 or 3 days it may be helpful to take your pain medications on a regular schedule (e.g. every 4 to 6 hours). This will help you to keep your pain under better control. You should then begin to take fewer medications each day until you no longer need them. Do not take pain medication on an empty stomach. This may lead to nausea and vomiting.            Activity as tolerated   Order Comments: Return to normal activity slowly as you feel able.  For 6 weeks your exercise should be limited to walking.  You may walk as far as you wish, as long as you increase your level of exertion gradually and avoid slippery surfaces.     If you had a hysterectomy at the surgery do not insert anything in your vagina for 9 weeks.            Shower on day dressing removed (No bath)   Order Comments: You may shower at any time but should avoid immersing any abdominal incisions in water for at least 2 weeks after surgery or until the wound is completely healed.      Pain Medicine Schedule     Acetaminophen (Tylenol):  325 mg tablets - take two tablets (650 mg) every 6 hours as needed              MAXIMUM ALLOWED:  Do not exceed 3,000 mg in a 24-hour period  AND  Ibuprofen (Motrin, Advil):  200 mg  tablets - take three tablets (600 mg) every 6 hours as needed              MAXIMUM ALLOWED:  Do not exceed 2,400 mg in a 24-hour period  You can alternate acetaminophen and ibuprofen every 3 hours.  __________________________________________________________  If you are given stronger pain medication (tramadol or oxycodone), you can take this every 6 hours if pain is not controlled by alternating acetaminophen and ibuprofen.     Tramadol:  50 mg tablets - take 1 tablet (50 mg) every 6 hours as needed  OR  Oxycodone: 5 mg tablets - take 1 tablet (5 mg) every 6 hours as needed

## 2024-07-10 NOTE — H&P
"REFERRING PROVIDER  Emeka Sandoval MD     INTERVAL HISTORY  CC: Lichen Sclerosis Follow-up    Mari Galvan is a 64 y.o.  who I treated last year for lichen sclerosis.  Her intitial consultation was  2022.  At that time, I started her on a 3 month treatment plan of high potency steroid.  She followed up 8/3/2022 with dramatic improvement, but did not follow-up again after that.  She returns today stating that she has had several month worsening of her vulvar symptoms that are refractory steroid ointment (which has become very painful to apply). She is taking tylenol regularly for pain.    HPI or ONCOLOGIC HISTORY  Referred with with severe lichen sclerosus.  She states that it got worse from  to  with paresthesias (pins and needles) and pruritis.  She also felt like her vagina was closing off, unable to have intercourse since her laser treatment 10/20/2021.  She has a h/o LORI/BSO.      2022 - Received records that indicate patient seen 2020 for annual exam with concern for "itching and odor - no relief with monistat; intense itching at labia".  She was treated with twice daily clobetasol for 2 weeks and returned for biopsy detailed below.  Last visit note from 10/2/2020 when she was directed to continue clobetasol and estrace.    Data Reviewed   2020 Vulvar Biopsy:  Lichenoid dermatitis with features worrisome for lichen sclerosis et atrophicus     2022  - 4;  CEA - 0.9    OBJECTIVE   Vitals:    07/10/24 0906   BP: 130/84   Pulse: 75   Resp: 18   Temp: 98.5 °F (36.9 °C)          Body mass index is 30.51 kg/m².     Physical Exam:   Constitutional: She is oriented to person, place, and time. She appears well-developed and well-nourished. No distress.    HENT:   Head: Normocephalic and atraumatic.    Eyes: Conjunctivae and EOM are normal.      Pulmonary/Chest: Effort normal. No respiratory distress.        Abdominal: Soft. She exhibits no distension and no mass. There is " no abdominal tenderness. There is no rebound and no guarding. No hernia.     Genitourinary:                     Neurological: She is alert and oriented to person, place, and time.    Skin: No rash noted.    Psychiatric: She has a normal mood and affect. Her behavior is normal.     ECOG status: 0    LABORATORY DATA  Lab data reviewed.    RADIOLOGICAL DATA  Radiology data reviewed.    PATHOLOGY DATA  Pathology data reviewed.    ASSESSMENT    1. Vulvar lesion      Significant deterioration of vulvar changes in patient with known lichen sclerosis. She need mapping biopsies to rule out development of squamous cell carcinoma.    I discussed extensively the risks, benefits, alternatives, and indications of the planned wide-local excision / partial vulvectomy to include the risk of poor wound healing and for some women, it may take several weeks for the wound to heal.  Other risks include both perioperative and chronic pain, change in sensation, scarring, change in appearance, infection, and bleeding.  She expresses understanding, was given an opportunity to ask any questions, and now she desires to proceed with planned procedure.  Informed consent was signed.       PLAN  H&P Update  Patient seen by me this morning and we again discussed our planned procedure.  There are no substantive changes to the H&P.  All questions were answered and the patient is ready to proceed with surgery.      Gregory Kelly MD

## 2024-07-10 NOTE — ANESTHESIA PREPROCEDURE EVALUATION
07/09/2024  Mari Galvan is a 64 y.o., female, who presents for the following:    Procedure: VULVECTOMY, PARTIAL (Bilateral: Vagina) - WITH WIDE LOCAL EXCISION   Anesthesia type: General   Diagnosis: Lichen sclerosus et atrophicus [L90.0]   Pre-op diagnosis: Lichen sclerosus et atrophicus [L90.0]   Location: [facility] OR 04 / [facility] OR   Surgeons: Gregory Kelly MD     LAB:        EKG : NSR      Pre-op Assessment    I have reviewed the Patient Summary Reports.     I have reviewed the Nursing Notes. I have reviewed the NPO Status.   I have reviewed the Medications.     Review of Systems  Anesthesia Hx:  No problems with previous Anesthesia             Denies Family Hx of Anesthesia complications.    Denies Personal Hx of Anesthesia complications.                    Social:  Smoker       Cardiovascular:  Cardiovascular Normal                                            Pulmonary:  Pulmonary Normal                       Endocrine:  Endocrine Normal                Physical Exam  General: Alert and Oriented    Airway:  Mallampati: II   Mouth Opening: Normal  TM Distance: Normal  Tongue: Normal  Neck ROM: Normal ROM    Dental:  Intact    Chest/Lungs:  Normal Respiratory Rate    Heart:  Rate: Normal  Rhythm: Regular Rhythm        Anesthesia Plan  Type of Anesthesia, risks & benefits discussed:    Anesthesia Type: Gen Supraglottic Airway  Intra-op Monitoring Plan: Standard ASA Monitors  Post Op Pain Control Plan: IV/PO Opioids PRN and multimodal analgesia  Induction:  IV  Airway Plan: Direct, Post-Induction  Informed Consent: Informed consent signed with the Patient and all parties understand the risks and agree with anesthesia plan.  All questions answered. Patient consented to blood products? Yes  ASA Score: 2  Day of Surgery Review of History & Physical: H&P Update referred to the surgeon/provider.  Anesthesia  Plan Notes: ERAS    Ready For Surgery From Anesthesia Perspective.     .

## 2024-07-10 NOTE — ANESTHESIA POSTPROCEDURE EVALUATION
Anesthesia Post Evaluation    Patient: Mari Galvan    Procedure(s) Performed: Procedure(s) (LRB):  VULVECTOMY, PARTIAL (Bilateral)    Final Anesthesia Type: general      Patient location during evaluation: PACU  Patient participation: Yes- Able to Participate  Level of consciousness: awake and alert  Post-procedure vital signs: reviewed and stable  Pain management: adequate  Airway patency: patent      Anesthetic complications: no      Cardiovascular status: hemodynamically stable  Respiratory status: unassisted  Hydration status: euvolemic  Follow-up not needed.              Vitals Value Taken Time   /68 07/10/24 1352   Temp 35.9 °C (96.6 °F) 07/10/24 1338   Pulse 72 07/10/24 1353   Resp 17 07/10/24 1353   SpO2 89 % 07/10/24 1353   Vitals shown include unfiled device data.      No case tracking events are documented in the log.      Pain/Agapito Score: Pain Rating Prior to Med Admin: 0 (7/10/2024  9:56 AM)

## 2024-07-10 NOTE — TRANSFER OF CARE
"Anesthesia Transfer of Care Note    Patient: Mari Galvan    Procedure(s) Performed: Procedure(s) (LRB):  VULVECTOMY, PARTIAL (Bilateral)    Patient location: PACU    Anesthesia Type: general    Transport from OR: Transported from OR on room air with adequate spontaneous ventilation    Post pain: adequate analgesia    Post assessment: no apparent anesthetic complications    Post vital signs: stable    Level of consciousness: awake and sedated    Nausea/Vomiting: no nausea/vomiting    Complications: none    Transfer of care protocol was followed      Last vitals: Visit Vitals  /84   Pulse 75   Temp 36.9 °C (98.5 °F) (Oral)   Resp 18   Ht 5' 6" (1.676 m)   Wt 85.7 kg (189 lb)   SpO2 95%   Breastfeeding No   BMI 30.51 kg/m²     "

## 2024-07-10 NOTE — DISCHARGE SUMMARY
Discharge Note     OUTCOME: Patient tolerated treatment/procedure well without complication and is now ready for discharge.     DISPOSITION: Home or Self Care     FINAL DIAGNOSIS:  S/P punch biopsy to L upper labia minora, L lower labia minora, R vulva  FOLLOWUP: In clinic      DISCHARGE INSTRUCTIONS:    Discharge Procedure Orders   Diet - regular              Other restrictions (specify):   Order Comments: PELVIC REST:  No douching, tampons, or intercourse for 2 weeks.     DRIVING:  No driving while on narcotics. Driving may be resumed initially with a competent passenger one to two weeks after surgery if no longer taking narcotics.      EXERCISE:  Resume mild to moderate exercise as tolerated        Wound care routine (specify)   Order Comments: WOUND CARE:  If you have a band-aid or bandage on your wound, you may remove it the day after dismissal.  You may wash the wound with mild soap and water.   You may shower at any time but should avoid immersing any abdominal incisions in water for at least two weeks after surgery or until the wound is completely healed. Keep your wound clean and dry.  You should observe your incision for signs of infection which include redness, warmth, drainage or fever.      Call MD for:  temperature >100.4      Call MD for:  persistent nausea and vomiting      Call MD for:  uncontrolled pain      Call MD for:  difficulty breathing, headache or visual disturbances      Call MD for:  redness, tenderness, or signs of infection (pain, swelling, redness, odor or green/yellow discharge around incision site)      Call MD for:  hives            Call MD for:   Order Comments: Inability to void, vaginal bleeding is heavier than a period.     VAGINAL DISCHARGE: You may develop a vaginal discharge and intermittent vaginal spotting after surgery and up to 6 weeks postoperatively.  The discharge may have an odor and may change in color but it is normal.  This is due to dissolving stiches.  Contact  your surgical team if you develop vaginal or vulvar irritation along with a discharge.  Also contact your surgical team if you have vaginal discharge that smells like urine or stool.     CONSTIPATION REMEDIES: Patients are often constipated after surgery or with use of oral narcotic medicine. You should continue to take the stool softener, Senokot-S during the next six weeks, and consume adequate amounts of water.  If you have not had a bowel movement for 3 days after dismissal, or are uncomfortable and unable to pass stool, please try one or all of the following measures:  1.  Milk of Magnesia - 30 cc by mouth every 12 hours   2.  Dulcolax suppository - One suppository per rectum every 4-6 hours   3.  Metamucil, Fibercon or other bulk former - use as directed  4.  Fleets Enema           PAIN MEDICATIONS:      Take your pain medications as instructed (see below). It is best to take pain medications before your pain becomes severe. This will allow you to take less medication yet have better pain relief. For the first 2 or 3 days it may be helpful to take your pain medications on a regular schedule (e.g. every 4 to 6 hours). This will help you to keep your pain under better control. You should then begin to take fewer medications each day until you no longer need them. Do not take pain medication on an empty stomach. This may lead to nausea and vomiting.            Activity as tolerated   Order Comments: Return to normal activity slowly as you feel able.  For 6 weeks your exercise should be limited to walking.  You may walk as far as you wish, as long as you increase your level of exertion gradually and avoid slippery surfaces.     If you had a hysterectomy at the surgery do not insert anything in your vagina for 9 weeks.            Shower on day dressing removed (No bath)   Order Comments: You may shower at any time but should avoid immersing any abdominal incisions in water for at least 2 weeks after surgery or  until the wound is completely healed.      Pain Medicine Schedule    Acetaminophen (Tylenol):  325 mg tablets - take two tablets (650 mg) every 6 hours as needed   MAXIMUM ALLOWED:  Do not exceed 3,000 mg in a 24-hour period  AND  Ibuprofen (Motrin, Advil):  200 mg tablets - take three tablets (600 mg) every 6 hours as needed   MAXIMUM ALLOWED:  Do not exceed 2,400 mg in a 24-hour period  You can alternate acetaminophen and ibuprofen every 3 hours.  __________________________________________________________  If you are given stronger pain medication (tramadol or oxycodone), you can take this every 6 hours if pain is not controlled by alternating acetaminophen and ibuprofen.    Tramadol:  50 mg tablets - take 1 tablet (50 mg) every 6 hours as needed  OR  Oxycodone: 5 mg tablets - take 1 tablet (5 mg) every 6 hours as needed

## 2024-07-10 NOTE — ANESTHESIA PROCEDURE NOTES
Intubation    Date/Time: 7/10/2024 12:43 PM    Performed by: Toni Shaw CRNA  Authorized by: Dorcas Loo MD    Intubation:     Induction:  Intravenous    Intubated:  Postinduction    Mask Ventilation:  Easy mask    Attempts:  1    Attempted By:  CRNA and student    Method of Intubation:  Direct    Blade:  [place] 2    Laryngeal View Grade: Grade IIA - cords partially seen      Difficult Airway Encountered?: No      Complications:  None    Airway Device:  Oral endotracheal tube    Airway Device Size:  7.5    Style/Cuff Inflation:  Cuffed    Tube secured:  22    Secured at:  The lips    Placement Verified By:  Capnometry    Complicating Factors:  None    Findings Post-Intubation:  BS equal bilateral

## 2024-07-10 NOTE — OP NOTE
[facility] - Periop Services  Gynecologic Oncology  Operative Note    SUMMARY     Date of Procedure: 7/10/2024     Procedure: Procedure(s) (LRB):  VULVECTOMY, PARTIAL (Bilateral)       Surgeons and Role:     * Gregory Kelly MD - Primary    Assisting Surgeon: None    Pre-Operative Diagnosis: Lichen sclerosus et atrophicus [L90.0]    Post-Operative Diagnosis: Post-Op Diagnosis Codes:     * Lichen sclerosus et atrophicus [L90.0]    Anesthesia: General    Technical Procedures Used:   1. EUA  2. Vulvar Biopsy x 3 with suture closure    Description of the Findings of the Procedure: The right labia minora was not visible but the vulvar epithelium in that area was thickened and plaque-like.  The left labia minora was thickened and hypertrophied with extensive fine papillary or verrucous changes extending circumferentially.       Procedure in Detail: After noting appropriate consents, the patient was taken to the operating room and placed in the dorsal lithotomy position. SCDs were started prior to SAWYER administration. An exam under anesthesia was done. The patient was then prepped and draped in the usual sterile manner.     A bivalve speculum was inserted into the vagina and an inspection of the vagina was done. The speculum was removed.    Next, 3 representative areas were selected for 4 mm punch biopsy and infiltrated with 1% lidocaine with epinephrine (left lower labia minora, left upper labia minora, right vulva).  Punch biopsies were taken and hemostasis was achieved with the Bovie and interrupted 3-0 Vicryl sutures.    Assuring hemostasis, the decision was made to close.  All instruments were removed from the vulva and vagina.  Sponge, lap, and needle counts were correct x 2.  The patient was awakened, extubated, and taken to the recovery room in good condition.    Significant Surgical Tasks Conducted by the Assistant(s), if Applicable: CAMMIE Landrum performed expert assist. I certify that the services  for which payment is claimed were medically necessary, and that no qualified resident was available to perform the services.    Complications: No    Estimated Blood Loss (EBL): 10 ml           Condition: Good    Disposition: PACU - hemodynamically stable.    Attestation: I was present and scrubbed for the entire procedure.

## 2024-07-11 VITALS
DIASTOLIC BLOOD PRESSURE: 78 MMHG | WEIGHT: 189 LBS | RESPIRATION RATE: 19 BRPM | TEMPERATURE: 97 F | OXYGEN SATURATION: 93 % | BODY MASS INDEX: 30.37 KG/M2 | HEIGHT: 66 IN | SYSTOLIC BLOOD PRESSURE: 123 MMHG | HEART RATE: 62 BPM

## 2024-07-16 ENCOUNTER — TELEPHONE (OUTPATIENT)
Dept: GYNECOLOGIC ONCOLOGY | Facility: CLINIC | Age: 64
End: 2024-07-16
Payer: MEDICAID

## 2024-07-16 NOTE — TELEPHONE ENCOUNTER
"Pt POD6 from EUA with vulvar biopsies x3. Pt contacted navigator with update that her external labia is having an annoying stinging/burning/itching feeling all the time but especially when she is getting up and sitting down. Pt states "it isn't quite painful, but I wanted to know if I could use a Vagisil or an itch cream on it". Pt reports all she is taking for discomfort is Tylenol and Motrin. The Tramadol that was provided made her itchy and nauseated. Pt updated that feedback from Dr Kelly/ Dana SUE would be obtained and pt contacted back with guidance.   "

## 2024-07-17 LAB — PSYCHE PATHOLOGY RESULT: NORMAL

## 2024-07-17 NOTE — TELEPHONE ENCOUNTER
I called the patient and relayed Dr. Kelly's suggestion. The patient states the area is NOT red or inflamed, just itching and occasional stinging. She will call either myself or [place] back in 2-3 days with an update if the cream is working or not, or her condition worsens. Patient verbalized an understanding and had no further questions or concerns.

## 2024-07-19 ENCOUNTER — TELEPHONE (OUTPATIENT)
Dept: GYNECOLOGIC ONCOLOGY | Facility: CLINIC | Age: 64
End: 2024-07-19
Payer: MEDICAID

## 2024-07-19 DIAGNOSIS — L90.0 LICHEN SCLEROSUS ET ATROPHICUS: Primary | ICD-10-CM

## 2024-07-19 NOTE — TELEPHONE ENCOUNTER
Pt contacted and scheduled for surgery Monday August 5th in [place] with Dr Gregory Kelly. Pt agreed to surgical date. Pt updated that the pre-op team would contact her a week prior to her procedure. Pt just had surgery on 7/10 and therefore Navigator did not enter new labs or pre-op testing orders. She will be contacted on Friday August 2nd with an arrival time for surgery.  Post-op appointment scheduled with Dana SUE. Pt verbalized understanding of all appointments. Pt encouraged to call for any concerns or questions.

## 2024-07-19 NOTE — PROGRESS NOTES
I called Ms. Galvan to check on her postop recovery and review her pathology results.  She is doing well and seems to be recovering very appropriately at this point after her EUA and biopsies.  We discussed her pathology results which demonstrated multifocal lichen sclerosis, dVIN, and a focus of microinvasive vulvar cancer.  I am scheduling her for partial vulvectomy 8/5/2024.      Today we reviewed partial vulvectomy consent.  I discussed extensively the risks, benefits, alternatives, and indications of the planned procedure to include the risk of poor wound healing and for some women, it may take several weeks for the wound to heal.  Other risks include both perioperative and chronic pain, change in sensation, scarring, change in appearance, infection, and bleeding.  She expressed understanding, was given an opportunity to ask any questions, and now she desires to proceed with planned procedure.

## 2024-08-01 NOTE — H&P
"REFERRING PROVIDER  Emeka Sandoval MD     INTERVAL HISTORY  CC: Lichen Sclerosis Follow-up    Mari Galvan is a 64 y.o. with multifocal lichen sclerosis, dVIN, and a focus of microinvasive vulvar cancer s/p vulvar biopsy on 7/10/24.  I am scheduling her for partial vulvectomy 8/5/2024.       HPI or ONCOLOGIC HISTORY  Referred with with severe lichen sclerosus.  She states that it got worse from 2021 to 2022 with paresthesias (pins and needles) and pruritis.  She also felt like her vagina was closing off, unable to have intercourse since her laser treatment 10/20/2021.  She has a h/o LORI/BSO.      6/30/2022 - Received records that indicate patient seen 9/11/2020 for annual exam with concern for "itching and odor - no relief with monistat; intense itching at labia".  She was treated with twice daily clobetasol for 2 weeks and returned for biopsy detailed below.  Last visit note from 10/2/2020 when she was directed to continue clobetasol and estrace.    Data Reviewed   9/25/2020 Vulvar Biopsy:  Lichenoid dermatitis with features worrisome for lichen sclerosis et atrophicus     5/6/2022  - 4;  CEA - 0.9    OBJECTIVE   There were no vitals filed for this visit.         Body mass index is 31.47 kg/m².     Physical Exam:   Constitutional: She is oriented to person, place, and time. She appears well-developed and well-nourished. No distress.    HENT:   Head: Normocephalic and atraumatic.    Eyes: Conjunctivae and EOM are normal.      Pulmonary/Chest: Effort normal. No respiratory distress.        Abdominal: Soft. She exhibits no distension and no mass. There is no abdominal tenderness. There is no rebound and no guarding. No hernia.     Genitourinary:                     Neurological: She is alert and oriented to person, place, and time.    Skin: No rash noted.    Psychiatric: She has a normal mood and affect. Her behavior is normal.     ECOG status: 0    LABORATORY DATA  Lab data reviewed.    RADIOLOGICAL " DATA  Radiology data reviewed.    PATHOLOGY DATA  Pathology data reviewed.    ASSESSMENT    1. Squamous cell carcinoma of vulva    2. Primary vulvar squamous cell carcinoma      Significant deterioration of vulvar changes in patient with known lichen sclerosis. She need mapping biopsies to rule out development of squamous cell carcinoma.    I discussed extensively the risks, benefits, alternatives, and indications of the planned wide-local excision / partial vulvectomy to include the risk of poor wound healing and for some women, it may take several weeks for the wound to heal.  Other risks include both perioperative and chronic pain, change in sensation, scarring, change in appearance, infection, and bleeding.  She expresses understanding, was given an opportunity to ask any questions, and now she desires to proceed with planned procedure.  Informed consent was signed.       PLAN  H&P Update  Patient seen by me this morning and we again discussed our planned procedure.  There are no substantive changes to the H&P.  All questions were answered and the patient is ready to proceed with surgery.      Gregory Kelly MD

## 2024-08-02 NOTE — PRE-PROCEDURE INSTRUCTIONS
"[facility]: Outpatient Surgery  Preprocedure Instructions    Expectations: "Because of inconsistent procedure completion times, an unexpected wait may occur. The physicians would like you to be here to prepare in the event they run ahead of time. We will make you as comfortable as possible and keep you informed. We apologize in advance if this happens."     Your arrival time for your surgery or procedure is __7:00am___.  We ask patients to arrive about 2 hours before surgery to allow for enough time to review your health history & medications, start your IV, complete any outstanding labwork or tests, and meet your Anesthesiologist.    We are located at [facility], [address] [place] [address].    Enter through the [facility] entrance next to the Emergency Room, and come to the 6th floor to the Outpatient Surgery Department.    If you are in need of a wheelchair the morning of surgery please call [phone] about 15 minutes before you arrive. Parking is available in our parking garage located off [facility], between the hospital and [facility].      Visitory Policy:  You are allowed 2 adult visitors to be with you in the hospital. Please, no switching visitors in pre-op area. All hospital visitors should be in good current health.  No small children.  We will update you and your family hourly on the progression of surgery and any unexpected delays.      What to Bring:  Please have your ID, insurance cards, and all home medication bottles with you at check in.  Bring your CPAP machine if one is used at home.     Fasting:  Nothing to eat or drink after midnight the night before your procedure. This includes no ice, gum, hard candies, and/or tobacco products.    Medications:  Follow your doctor's instructions for taking any medications on the morning of your procedure.  If no instructions for taking medications were given, do not take any medications but bring your " medications in their bottles to your procedure check in.     Follow your doctor's preoperative instructions regarding skin prep, bowel prep, bathing, or showering prior to your procedure.  If any special soaps were provided to you, please use according to your doctor's instructions. If no instructions were given from your doctor, take a good bath or shower with antibacterial soap the night before and the morning of your procedure.  On the morning of procedure, wear loose, comfortable clothing.  No lotions, makeup, perfumes, colognes, deodorant, or jewelry to your procedure.  Removable items (glasses, contact lenses, dentures, retainers, hearing aids) need to be removed for your procedure.  Bring your storage containers for these items if you wear them.     Artificial nails, body jewelry, eyelash extensions, and/or hair extensions with metal clips are not allowed during your surgery.  If you currently wear any of these items, please arrange for them to be removed prior to your arrival to the hospital.     Outpatient or Same Day Surgeries:  Any patients receiving sedation/anesthesia are advised not to drive for 24 hours after their procedure.  We do not allow patients to drive themselves home after discharge.  If you are going home after your procedure, please have someone available to drive you home from the hospital.     You may call the Outpatient Surgery Department at [phone] with any questions or concerns.  We are looking forward to meeting you and taking great care of you for your procedure.  Thank you for choosing [facility] for your surgical needs.

## 2024-08-05 ENCOUNTER — HOSPITAL ENCOUNTER (OUTPATIENT)
Facility: HOSPITAL | Age: 64
Discharge: HOME | End: 2024-08-06
Attending: OBSTETRICS & GYNECOLOGY | Admitting: OBSTETRICS & GYNECOLOGY
Payer: MEDICAID

## 2024-08-05 ENCOUNTER — ANESTHESIA EVENT (OUTPATIENT)
Dept: SURGERY | Facility: HOSPITAL | Age: 64
End: 2024-08-05
Payer: MEDICAID

## 2024-08-05 ENCOUNTER — ANESTHESIA (OUTPATIENT)
Dept: SURGERY | Facility: HOSPITAL | Age: 64
End: 2024-08-05
Payer: MEDICAID

## 2024-08-05 DIAGNOSIS — C51.9 PRIMARY VULVAR SQUAMOUS CELL CARCINOMA: ICD-10-CM

## 2024-08-05 DIAGNOSIS — L90.0 LICHEN SCLEROSUS ET ATROPHICUS: ICD-10-CM

## 2024-08-05 DIAGNOSIS — C51.9 SQUAMOUS CELL CARCINOMA OF VULVA: Primary | ICD-10-CM

## 2024-08-05 PROBLEM — F17.200 TOBACCO DEPENDENCY: Status: ACTIVE | Noted: 2024-08-05

## 2024-08-05 PROCEDURE — 63600175 PHARM REV CODE 636 W HCPCS

## 2024-08-05 PROCEDURE — 71000039 HC RECOVERY, EACH ADD'L HOUR: Performed by: OBSTETRICS & GYNECOLOGY

## 2024-08-05 PROCEDURE — 63600175 PHARM REV CODE 636 W HCPCS: Performed by: NURSE ANESTHETIST, CERTIFIED REGISTERED

## 2024-08-05 PROCEDURE — 11000001 HC ACUTE MED/SURG PRIVATE ROOM

## 2024-08-05 PROCEDURE — 25000003 PHARM REV CODE 250: Performed by: NURSE ANESTHETIST, CERTIFIED REGISTERED

## 2024-08-05 PROCEDURE — 25000003 PHARM REV CODE 250: Performed by: OBSTETRICS & GYNECOLOGY

## 2024-08-05 PROCEDURE — 71000033 HC RECOVERY, INTIAL HOUR: Performed by: OBSTETRICS & GYNECOLOGY

## 2024-08-05 PROCEDURE — 25000003 PHARM REV CODE 250

## 2024-08-05 PROCEDURE — 56620 VULVECTOMY SIMPLE PARTIAL: CPT | Mod: AS,,,

## 2024-08-05 PROCEDURE — G0378 HOSPITAL OBSERVATION PER HR: HCPCS

## 2024-08-05 PROCEDURE — 37000009 HC ANESTHESIA EA ADD 15 MINS: Performed by: OBSTETRICS & GYNECOLOGY

## 2024-08-05 PROCEDURE — 36000706: Performed by: OBSTETRICS & GYNECOLOGY

## 2024-08-05 PROCEDURE — 57106 VAGNC PRTL RMVL VAG WALL: CPT | Mod: 51,,, | Performed by: OBSTETRICS & GYNECOLOGY

## 2024-08-05 PROCEDURE — 88305 TISSUE EXAM BY PATHOLOGIST: CPT | Performed by: OBSTETRICS & GYNECOLOGY

## 2024-08-05 PROCEDURE — 36000707: Performed by: OBSTETRICS & GYNECOLOGY

## 2024-08-05 PROCEDURE — 37000008 HC ANESTHESIA 1ST 15 MINUTES: Performed by: OBSTETRICS & GYNECOLOGY

## 2024-08-05 PROCEDURE — 57106 VAGNC PRTL RMVL VAG WALL: CPT | Mod: AS,51,,

## 2024-08-05 PROCEDURE — 56620 VULVECTOMY SIMPLE PARTIAL: CPT | Mod: ,,, | Performed by: OBSTETRICS & GYNECOLOGY

## 2024-08-05 PROCEDURE — 27201423 OPTIME MED/SURG SUP & DEVICES STERILE SUPPLY: Performed by: OBSTETRICS & GYNECOLOGY

## 2024-08-05 RX ORDER — MUPIROCIN 20 MG/G
OINTMENT TOPICAL
Status: DISCONTINUED | OUTPATIENT
Start: 2024-08-05 | End: 2024-08-05 | Stop reason: HOSPADM

## 2024-08-05 RX ORDER — ONDANSETRON 8 MG/1
8 TABLET, ORALLY DISINTEGRATING ORAL EVERY 8 HOURS PRN
Qty: 10 TABLET | Refills: 0 | Status: SHIPPED | OUTPATIENT
Start: 2024-08-05

## 2024-08-05 RX ORDER — DOCUSATE SODIUM 100 MG/1
100 CAPSULE, LIQUID FILLED ORAL 2 TIMES DAILY
Qty: 30 CAPSULE | Refills: 0 | Status: SHIPPED | OUTPATIENT
Start: 2024-08-05

## 2024-08-05 RX ORDER — METOCLOPRAMIDE HYDROCHLORIDE 5 MG/ML
10 INJECTION INTRAMUSCULAR; INTRAVENOUS EVERY 10 MIN PRN
Status: DISCONTINUED | OUTPATIENT
Start: 2024-08-05 | End: 2024-08-05 | Stop reason: HOSPADM

## 2024-08-05 RX ORDER — OXYCODONE HYDROCHLORIDE 5 MG/1
5 TABLET ORAL EVERY 4 HOURS PRN
Status: CANCELLED | OUTPATIENT
Start: 2024-08-05

## 2024-08-05 RX ORDER — NALOXONE HCL 0.4 MG/ML
0.02 VIAL (ML) INJECTION
Status: DISCONTINUED | OUTPATIENT
Start: 2024-08-05 | End: 2024-08-06 | Stop reason: HOSPADM

## 2024-08-05 RX ORDER — ACETAMINOPHEN 500 MG
1000 TABLET ORAL EVERY 6 HOURS
Status: DISCONTINUED | OUTPATIENT
Start: 2024-08-05 | End: 2024-08-06 | Stop reason: HOSPADM

## 2024-08-05 RX ORDER — PROCHLORPERAZINE EDISYLATE 5 MG/ML
5 INJECTION INTRAMUSCULAR; INTRAVENOUS EVERY 6 HOURS PRN
Status: DISCONTINUED | OUTPATIENT
Start: 2024-08-05 | End: 2024-08-06 | Stop reason: HOSPADM

## 2024-08-05 RX ORDER — MUPIROCIN 20 MG/G
OINTMENT TOPICAL 2 TIMES DAILY
Status: DISCONTINUED | OUTPATIENT
Start: 2024-08-05 | End: 2024-08-06 | Stop reason: HOSPADM

## 2024-08-05 RX ORDER — FENTANYL CITRATE 50 UG/ML
INJECTION, SOLUTION INTRAMUSCULAR; INTRAVENOUS
Status: DISCONTINUED | OUTPATIENT
Start: 2024-08-05 | End: 2024-08-05

## 2024-08-05 RX ORDER — ACETAMINOPHEN 10 MG/ML
INJECTION, SOLUTION INTRAVENOUS
Status: DISCONTINUED | OUTPATIENT
Start: 2024-08-05 | End: 2024-08-05

## 2024-08-05 RX ORDER — OXYCODONE HYDROCHLORIDE 5 MG/1
5 TABLET ORAL EVERY 6 HOURS PRN
Status: DISCONTINUED | OUTPATIENT
Start: 2024-08-05 | End: 2024-08-06 | Stop reason: HOSPADM

## 2024-08-05 RX ORDER — MIDAZOLAM HYDROCHLORIDE 1 MG/ML
INJECTION INTRAMUSCULAR; INTRAVENOUS
Status: DISCONTINUED | OUTPATIENT
Start: 2024-08-05 | End: 2024-08-05

## 2024-08-05 RX ORDER — FENTANYL CITRATE 50 UG/ML
25 INJECTION, SOLUTION INTRAMUSCULAR; INTRAVENOUS EVERY 5 MIN PRN
Status: DISCONTINUED | OUTPATIENT
Start: 2024-08-05 | End: 2024-08-05 | Stop reason: HOSPADM

## 2024-08-05 RX ORDER — METOCLOPRAMIDE HYDROCHLORIDE 5 MG/ML
5 INJECTION INTRAMUSCULAR; INTRAVENOUS EVERY 6 HOURS PRN
Status: CANCELLED | OUTPATIENT
Start: 2024-08-05

## 2024-08-05 RX ORDER — ACETAMINOPHEN 500 MG
500 TABLET ORAL EVERY 6 HOURS PRN
Qty: 30 TABLET | Refills: 0 | Status: SHIPPED | OUTPATIENT
Start: 2024-08-05

## 2024-08-05 RX ORDER — LIDOCAINE HYDROCHLORIDE AND EPINEPHRINE 10; 10 MG/ML; UG/ML
INJECTION, SOLUTION INFILTRATION; PERINEURAL
Status: DISCONTINUED | OUTPATIENT
Start: 2024-08-05 | End: 2024-08-05 | Stop reason: HOSPADM

## 2024-08-05 RX ORDER — MEPERIDINE HYDROCHLORIDE 25 MG/ML
12.5 INJECTION INTRAMUSCULAR; INTRAVENOUS; SUBCUTANEOUS EVERY 10 MIN PRN
Status: DISCONTINUED | OUTPATIENT
Start: 2024-08-05 | End: 2024-08-05 | Stop reason: HOSPADM

## 2024-08-05 RX ORDER — IBUPROFEN 200 MG
200 TABLET ORAL EVERY 6 HOURS
Status: DISCONTINUED | OUTPATIENT
Start: 2024-08-06 | End: 2024-08-06 | Stop reason: HOSPADM

## 2024-08-05 RX ORDER — HYDROMORPHONE HYDROCHLORIDE 2 MG/ML
0.5 INJECTION, SOLUTION INTRAMUSCULAR; INTRAVENOUS; SUBCUTANEOUS
Status: CANCELLED | OUTPATIENT
Start: 2024-08-05

## 2024-08-05 RX ORDER — PROPOFOL 10 MG/ML
VIAL (ML) INTRAVENOUS
Status: DISCONTINUED | OUTPATIENT
Start: 2024-08-05 | End: 2024-08-05

## 2024-08-05 RX ORDER — LIDOCAINE HYDROCHLORIDE 10 MG/ML
1 INJECTION, SOLUTION EPIDURAL; INFILTRATION; INTRACAUDAL; PERINEURAL ONCE
Status: DISCONTINUED | OUTPATIENT
Start: 2024-08-05 | End: 2024-08-05 | Stop reason: HOSPADM

## 2024-08-05 RX ORDER — GLYCOPYRROLATE 0.2 MG/ML
INJECTION INTRAMUSCULAR; INTRAVENOUS
Status: DISCONTINUED | OUTPATIENT
Start: 2024-08-05 | End: 2024-08-05

## 2024-08-05 RX ORDER — IBUPROFEN 800 MG/1
800 TABLET ORAL EVERY 6 HOURS PRN
Qty: 30 TABLET | Refills: 2 | Status: SHIPPED | OUTPATIENT
Start: 2024-08-05 | End: 2025-08-05

## 2024-08-05 RX ORDER — KETOROLAC TROMETHAMINE 30 MG/ML
INJECTION, SOLUTION INTRAMUSCULAR; INTRAVENOUS
Status: DISCONTINUED | OUTPATIENT
Start: 2024-08-05 | End: 2024-08-05

## 2024-08-05 RX ORDER — ONDANSETRON HYDROCHLORIDE 2 MG/ML
4 INJECTION, SOLUTION INTRAVENOUS EVERY 12 HOURS PRN
Status: CANCELLED | OUTPATIENT
Start: 2024-08-05

## 2024-08-05 RX ORDER — OXYCODONE HYDROCHLORIDE 5 MG/1
5 TABLET ORAL EVERY 6 HOURS PRN
Qty: 10 TABLET | Refills: 0 | Status: SHIPPED | OUTPATIENT
Start: 2024-08-05

## 2024-08-05 RX ORDER — ROCURONIUM BROMIDE 10 MG/ML
INJECTION, SOLUTION INTRAVENOUS
Status: DISCONTINUED | OUTPATIENT
Start: 2024-08-05 | End: 2024-08-05

## 2024-08-05 RX ORDER — IBUPROFEN 600 MG/1
600 TABLET ORAL EVERY 6 HOURS PRN
Status: CANCELLED | OUTPATIENT
Start: 2024-08-05

## 2024-08-05 RX ORDER — DEXAMETHASONE SODIUM PHOSPHATE 4 MG/ML
INJECTION, SOLUTION INTRA-ARTICULAR; INTRALESIONAL; INTRAMUSCULAR; INTRAVENOUS; SOFT TISSUE
Status: DISCONTINUED | OUTPATIENT
Start: 2024-08-05 | End: 2024-08-05

## 2024-08-05 RX ORDER — DOCUSATE SODIUM 100 MG/1
100 CAPSULE, LIQUID FILLED ORAL 2 TIMES DAILY
Status: DISCONTINUED | OUTPATIENT
Start: 2024-08-05 | End: 2024-08-06 | Stop reason: HOSPADM

## 2024-08-05 RX ORDER — HYDROMORPHONE HYDROCHLORIDE 2 MG/ML
0.4 INJECTION, SOLUTION INTRAMUSCULAR; INTRAVENOUS; SUBCUTANEOUS
Status: CANCELLED | OUTPATIENT
Start: 2024-08-05

## 2024-08-05 RX ORDER — ONDANSETRON HYDROCHLORIDE 2 MG/ML
4 INJECTION, SOLUTION INTRAVENOUS DAILY PRN
Status: DISCONTINUED | OUTPATIENT
Start: 2024-08-05 | End: 2024-08-05 | Stop reason: HOSPADM

## 2024-08-05 RX ORDER — HYDROMORPHONE HYDROCHLORIDE 2 MG/ML
0.2 INJECTION, SOLUTION INTRAMUSCULAR; INTRAVENOUS; SUBCUTANEOUS EVERY 6 HOURS PRN
Status: DISCONTINUED | OUTPATIENT
Start: 2024-08-05 | End: 2024-08-06 | Stop reason: HOSPADM

## 2024-08-05 RX ORDER — KETOROLAC TROMETHAMINE 30 MG/ML
15 INJECTION, SOLUTION INTRAMUSCULAR; INTRAVENOUS EVERY 6 HOURS
Status: DISCONTINUED | OUTPATIENT
Start: 2024-08-05 | End: 2024-08-06 | Stop reason: HOSPADM

## 2024-08-05 RX ORDER — ONDANSETRON HYDROCHLORIDE 2 MG/ML
INJECTION, SOLUTION INTRAVENOUS
Status: DISCONTINUED | OUTPATIENT
Start: 2024-08-05 | End: 2024-08-05

## 2024-08-05 RX ORDER — SODIUM CHLORIDE 0.9 % (FLUSH) 0.9 %
10 SYRINGE (ML) INJECTION
Status: DISCONTINUED | OUTPATIENT
Start: 2024-08-05 | End: 2024-08-05 | Stop reason: HOSPADM

## 2024-08-05 RX ORDER — ONDANSETRON HYDROCHLORIDE 2 MG/ML
4 INJECTION, SOLUTION INTRAVENOUS EVERY 6 HOURS PRN
Status: DISCONTINUED | OUTPATIENT
Start: 2024-08-05 | End: 2024-08-06 | Stop reason: HOSPADM

## 2024-08-05 RX ORDER — GLUCAGON 1 MG
1 KIT INJECTION
Status: DISCONTINUED | OUTPATIENT
Start: 2024-08-05 | End: 2024-08-05 | Stop reason: HOSPADM

## 2024-08-05 RX ORDER — LIDOCAINE HYDROCHLORIDE 20 MG/ML
INJECTION, SOLUTION EPIDURAL; INFILTRATION; INTRACAUDAL; PERINEURAL
Status: DISCONTINUED | OUTPATIENT
Start: 2024-08-05 | End: 2024-08-05

## 2024-08-05 RX ADMIN — KETOROLAC TROMETHAMINE 15 MG: 30 INJECTION, SOLUTION INTRAMUSCULAR at 06:08

## 2024-08-05 RX ADMIN — MIDAZOLAM HYDROCHLORIDE 2 MG: 1 INJECTION, SOLUTION INTRAMUSCULAR; INTRAVENOUS at 11:08

## 2024-08-05 RX ADMIN — MUPIROCIN: 20 OINTMENT TOPICAL at 09:08

## 2024-08-05 RX ADMIN — MUPIROCIN: 20 OINTMENT TOPICAL at 11:08

## 2024-08-05 RX ADMIN — ACETAMINOPHEN 1000 MG: 10 INJECTION, SOLUTION INTRAVENOUS at 12:08

## 2024-08-05 RX ADMIN — KETOROLAC TROMETHAMINE 10 MG: 30 INJECTION, SOLUTION INTRAMUSCULAR; INTRAVENOUS at 12:08

## 2024-08-05 RX ADMIN — LIDOCAINE HYDROCHLORIDE 3 ML: 20 INJECTION, SOLUTION INTRAVENOUS at 11:08

## 2024-08-05 RX ADMIN — GLYCOPYRROLATE 0.2 MG: 0.2 INJECTION INTRAMUSCULAR; INTRAVENOUS at 12:08

## 2024-08-05 RX ADMIN — SODIUM CHLORIDE, SODIUM GLUCONATE, SODIUM ACETATE, POTASSIUM CHLORIDE AND MAGNESIUM CHLORIDE: 526; 502; 368; 37; 30 INJECTION, SOLUTION INTRAVENOUS at 11:08

## 2024-08-05 RX ADMIN — GLYCOPYRROLATE 0.2 MG: 0.2 INJECTION INTRAMUSCULAR; INTRAVENOUS at 11:08

## 2024-08-05 RX ADMIN — PROPOFOL 130 MG: 10 INJECTION, EMULSION INTRAVENOUS at 11:08

## 2024-08-05 RX ADMIN — DEXAMETHASONE SODIUM PHOSPHATE 8 MG: 4 INJECTION, SOLUTION INTRA-ARTICULAR; INTRALESIONAL; INTRAMUSCULAR; INTRAVENOUS; SOFT TISSUE at 11:08

## 2024-08-05 RX ADMIN — SUGAMMADEX 200 MG: 100 INJECTION, SOLUTION INTRAVENOUS at 12:08

## 2024-08-05 RX ADMIN — ROCURONIUM BROMIDE 60 MG: 10 SOLUTION INTRAVENOUS at 11:08

## 2024-08-05 RX ADMIN — FENTANYL CITRATE 150 MCG: 50 INJECTION, SOLUTION INTRAMUSCULAR; INTRAVENOUS at 11:08

## 2024-08-05 RX ADMIN — ACETAMINOPHEN 1000 MG: 500 TABLET ORAL at 06:08

## 2024-08-05 RX ADMIN — DOCUSATE SODIUM 100 MG: 100 CAPSULE, LIQUID FILLED ORAL at 09:08

## 2024-08-05 RX ADMIN — ONDANSETRON 4 MG: 2 INJECTION INTRAMUSCULAR; INTRAVENOUS at 11:08

## 2024-08-05 RX ADMIN — FENTANYL CITRATE 50 MCG: 50 INJECTION, SOLUTION INTRAMUSCULAR; INTRAVENOUS at 01:08

## 2024-08-05 NOTE — OP NOTE
[facility] - Periop Services  Gynecologic Oncology  Operative Note    SUMMARY     Date of Procedure: 8/5/2024     Procedure: Procedure(s) (LRB):  VULVECTOMY, PARTIAL (N/A)     VAGINECTOMY, PARTIAL    Surgeons and Role:     * Gregory Kelly MD - Primary    Assisting Surgeon:      * Dana Fabian PA    Pre-Operative Diagnosis: Lichen sclerosus et atrophicus [L90.0]  Vulvar Squamous Cell Carcinoma  Differentiated vulvar intraepithelial neoplasia    Post-Operative Diagnosis: Post-Op Diagnosis Codes:     * Lichen sclerosus et atrophicus [L90.0]  Vulvar Squamous Cell Carcinoma  Differentiated vulvar intraepithelial neoplasia    Anesthesia: General    Technical Procedures Used:   Partial Superficial Vulvectomy  Partial Superficial Distal Vaginectomy    Description of the Findings of the Procedure: The right labia minora was not visible but the vulvar epithelium in that area was thickened and plaque-like. The left labia minora was thickened and hypertrophied with extensive fine papillary or verrucous changes extending circumferentially. The pathologic changes extended 2-3 cm into the vagina and involved the tissue between the clitoris and anterior aspect of the urethra.        Procedure in Detail: After noting appropriate consents, the patient was taken to the operating room and placed in the dorsal lithotomy position. SCDs were started prior to GETA. An exam under anesthesia was done. The patient was then prepped and draped in the usual sterile manner and a castro catheter was placed.    A Lonestar retractor was placed over the vulva. The pathologic area was outlined with a marking pen and then injected with 1% lidocaine with epi.  The scalpel was used to outline the lesion and needle tip cautery was used to resect the abnormal tissue.  The ESU and several interrupted sutures were used to achieve hemostasis.  The inferior portion of the procedure was done with a rectal finger in place to insure the  incision was well away from the distal rectum. A deep approximating layer of 3-0 Vicryl suture was placed.  A series of mattress and simple interrupted sutures were placed to reapproximate the vulva and vagina.  Hemostasis was assured.     All instruments were removed from the vulva and vagina.  Sponge, lap, and needle counts were correct x 2.  The patient was awakened, extubated, and taken to the recovery room in good condition.       Significant Surgical Tasks Conducted by the Assistant(s), if Applicable: CAMMIE Landrum performed expert assist. I certify that the services for which payment is claimed were medically necessary, and that no qualified resident was available to perform the services.    Complications: No    Estimated Blood Loss (EBL): 20 ml           Condition: Good    Disposition: PACU - hemodynamically stable.    Attestation: I was present and scrubbed for the entire procedure.

## 2024-08-06 VITALS
DIASTOLIC BLOOD PRESSURE: 79 MMHG | WEIGHT: 195.13 LBS | SYSTOLIC BLOOD PRESSURE: 129 MMHG | HEIGHT: 66 IN | BODY MASS INDEX: 31.36 KG/M2 | OXYGEN SATURATION: 93 % | RESPIRATION RATE: 20 BRPM | HEART RATE: 82 BPM | TEMPERATURE: 98 F

## 2024-08-06 PROBLEM — C51.9 PRIMARY VULVAR SQUAMOUS CELL CARCINOMA: Status: ACTIVE | Noted: 2024-08-06

## 2024-08-06 PROBLEM — F17.200 TOBACCO DEPENDENCY: Status: RESOLVED | Noted: 2024-08-05 | Resolved: 2024-08-06

## 2024-08-06 PROCEDURE — 63600175 PHARM REV CODE 636 W HCPCS

## 2024-08-06 PROCEDURE — 25000003 PHARM REV CODE 250

## 2024-08-06 PROCEDURE — G0378 HOSPITAL OBSERVATION PER HR: HCPCS

## 2024-08-06 RX ADMIN — KETOROLAC TROMETHAMINE 15 MG: 30 INJECTION, SOLUTION INTRAMUSCULAR at 12:08

## 2024-08-06 RX ADMIN — ACETAMINOPHEN 1000 MG: 500 TABLET ORAL at 12:08

## 2024-08-06 RX ADMIN — KETOROLAC TROMETHAMINE 15 MG: 30 INJECTION, SOLUTION INTRAMUSCULAR at 05:08

## 2024-08-06 NOTE — NURSING
Nurses Note -- 4 Eyes      8/5/2024   8:40 PM      Skin assessed during: Admit      [x] No Altered Skin Integrity Present    []Prevention Measures Documented      [] Yes- Altered Skin Integrity Present or Discovered   [] LDA Added if Not in Epic (Describe Wound)   [] New Altered Skin Integrity was Present on Admit and Documented in LDA   [] Wound Image Taken    Wound Care Consulted? No    Attending Nurse:  Sarah Rudd RN/Staff Member:   Darlene

## 2024-08-06 NOTE — DISCHARGE SUMMARY
Discharge Note     OUTCOME: Patient tolerated treatment/procedure well without complication and is now ready for discharge.     DISPOSITION: Home or Self Care     FINAL DIAGNOSIS:  S/P Partial Superficial Vulvectomy. Partial Superficial Distal Vaginectomy   FOLLOWUP: In clinic      DISCHARGE INSTRUCTIONS:    Discharge Procedure Orders   Diet general            Lifting restrictions   Order Comments: No lifting greater than 15 pounds for six weeks.            Other restrictions (specify):   Order Comments: PELVIC REST:  No douching, tampons, or intercourse for 8 weeks.     DRIVING:  No driving while on narcotics. Driving may be resumed initially with a competent passenger one to two weeks after surgery if no longer taking narcotics.      EXERCISE:  For six weeks your exercise should be limited to walking. You may walk as far as you wish, as long as you increase your level of exertion gradually and avoid slippery surfaces. You may climb stairs as needed to get around, but should not use stair climbing for exercise.            Remove dressing in 24 hours   Order Comments: If you have a bandage on wound, you may remove it the day after dismissal.  If you had steri-strips remove them once they begin to peel off (usually 2 weeks). Keep incision clean and dry.              Wound care routine (specify)   Order Comments: WOUND CARE:  If you have a band-aid or bandage on your wound, you may remove it the day after dismissal.  You may wash the wound with mild soap and water.   You may shower at any time but should avoid immersing any abdominal incisions in water for at least two weeks after surgery or until the wound is completely healed. I Keep your wound clean and dry.  You should observe your incision for signs of infection which include redness, warmth, drainage or fever.      Call MD for:  temperature >100.4      Call MD for:  persistent nausea and vomiting      Call MD for:  uncontrolled pain      Call MD for:  difficulty  breathing, headache or visual disturbances      Call MD for:  redness, tenderness, or signs of infection (pain, swelling, redness, odor or green/yellow discharge around incision site)      Call MD for:  rona            Call MD for:   Order Comments: Inability to void,urine is ketchup colored or you have large clots, vaginal bleeding is heavier than a period.     VAGINAL DISCHARGE: You may develop a vaginal discharge and intermittent vaginal spotting after surgery and up to 6 weeks postoperatively.  The discharge may have an odor and may change in color but it is normal.  This is due to dissolving stiches.  Contact your surgical team if you develop vaginal or vulvar irritation along with a discharge.  Also contact your surgical team if you have vaginal discharge that smells like urine or stool.     CONSTIPATION REMEDIES: Patients are often constipated after surgery or with use of oral narcotic medicine. You should continue to take the stool softener, Senokot-S during the next six weeks, and consume adequate amounts of water.  If you have not had a bowel movement for 3 days after dismissal, or are uncomfortable and unable to pass stool, please try one or all of the following measures:  1.  Milk of Magnesia - 30 cc by mouth every 12 hours   2.  Dulcolax suppository - One suppository per rectum every 4-6 hours   3.  Metamucil, Fibercon or other bulk former - use as directed  4.  Fleets Enema           PAIN MEDICATIONS:      Take your pain medications as instructed (see below). It is best to take pain medications before your pain becomes severe. This will allow you to take less medication yet have better pain relief. For the first 2 or 3 days it may be helpful to take your pain medications on a regular schedule (e.g. every 4 to 6 hours). This will help you to keep your pain under better control. You should then begin to take fewer medications each day until you no longer need them. Do not take pain medication on an  empty stomach. This may lead to nausea and vomiting.            Activity as tolerated   Order Comments: Return to normal activity slowly as you feel able.  For 6 weeks your exercise should be limited to walking.  You may walk as far as you wish, as long as you increase your level of exertion gradually and avoid slippery surfaces.     If you had a hysterectomy at the surgery do not insert anything in your vagina for 9 weeks.            Shower on day dressing removed (No bath)   Order Comments: You may shower at any time but should avoid immersing any abdominal incisions in water for at least 2 weeks after surgery or until the wound is completely healed.      Pain Medicine Schedule    Acetaminophen (Tylenol):  325 mg tablets - take two tablets (650 mg) every 6 hours as needed   MAXIMUM ALLOWED:  Do not exceed 3,000 mg in a 24-hour period  AND  Ibuprofen (Motrin, Advil):  200 mg tablets - take three tablets (600 mg) every 6 hours as needed   MAXIMUM ALLOWED:  Do not exceed 2,400 mg in a 24-hour period  You can alternate acetaminophen and ibuprofen every 3 hours.  __________________________________________________________  If you are given stronger pain medication (tramadol or oxycodone), you can take this every 6 hours if pain is not controlled by alternating acetaminophen and ibuprofen.    Tramadol:  50 mg tablets - take 1 tablet (50 mg) every 6 hours as needed  OR  Oxycodone: 5 mg tablets - take 1 tablet (5 mg) every 6 hours as needed

## 2024-08-06 NOTE — NURSING
Nurses Note -- 4 Eyes      8/6/2024   10:31 AM      Skin assessed during: Q Shift Change      [x] No Altered Skin Integrity Present    []Prevention Measures Documented      [] Yes- Altered Skin Integrity Present or Discovered   [] LDA Added if Not in Epic (Describe Wound)   [] New Altered Skin Integrity was Present on Admit and Documented in LDA   [] Wound Image Taken    Wound Care Consulted? No    Attending Nurse:  Sarah Rudd RN/Staff Member:   Lis

## 2024-08-06 NOTE — NURSING
Nurses Note -- 4 Eyes      8/5/2024   7:26 PM      Skin assessed during: Q Shift Change      [x] No Altered Skin Integrity Present    [x]Prevention Measures Documented      [] Yes- Altered Skin Integrity Present or Discovered   [] LDA Added if Not in Epic (Describe Wound)   [] New Altered Skin Integrity was Present on Admit and Documented in LDA   [] Wound Image Taken    Wound Care Consulted? No    Attending Nurse:  Lis Rudd RN/Staff Member:   ZHANG Smith

## 2024-08-06 NOTE — PLAN OF CARE
08/06/24 0939   Discharge Assessment   Assessment Type Discharge Planning Assessment   Confirmed/corrected address, phone number and insurance Yes   Confirmed Demographics Correct on Facesheet   Source of Information patient   Reason For Admission Vulvar [place]   People in Home spouse   Facility Arrived From: Home   Do you expect to return to your current living situation? Yes   Do you have help at home or someone to help you manage your care at home? Yes   Who are your caregiver(s) and their phone number(s)? SpouseTroy [phone]   Prior to hospitilization cognitive status: Unable to Assess   Current cognitive status: Alert/Oriented   Walking or Climbing Stairs Difficulty no   Dressing/Bathing Difficulty no   Home Layout Able to live on 1st floor   Equipment Currently Used at Home none   Patient currently being followed by outpatient case management? No   Do you currently have service(s) that help you manage your care at home? No   Do you take prescription medications? Yes   Do you have prescription coverage? Yes   Coverage JOCELYN   Do you have any problems affording any of your prescribed medications? No   Is the patient taking medications as prescribed? yes   Who is going to help you get home at discharge? Troy Aburto   How do you get to doctors appointments? car, drives self;family or friend will provide   Are you on dialysis? No   Do you take coumadin? No   Discharge Plan A Home   Discharge Plan B Home   DME Needed Upon Discharge  none   Discharge Plan discussed with: Spouse/sig other;Patient   Name(s) and Number(s) Troy Aburto   Transition of Care Barriers None   Physical Activity   On average, how many days per week do you engage in moderate to strenuous exercise (like a brisk walk)? 0 days   On average, how many minutes do you engage in exercise at this level? 0 min   Financial Resource Strain   How hard is it for you to pay for the very basics like food, housing, medical care, and  heating? Not very   Housing Stability   In the last 12 months, was there a time when you were not able to pay the mortgage or rent on time? N   At any time in the past 12 months, were you homeless or living in a shelter (including now)? N   Transportation Needs   Has the lack of transportation kept you from medical appointments, meetings, work or from getting things needed for daily living? No   Food Insecurity   Within the past 12 months, you worried that your food would run out before you got the money to buy more. Never true   Stress   Do you feel stress - tense, restless, nervous, or anxious, or unable to sleep at night because your mind is troubled all the time - these days? Not at all   Social Isolation   How often do you feel lonely or isolated from those around you?  Never   Alcohol Use   Q1: How often do you have a drink containing alcohol? Never   Q2: How many drinks containing alcohol do you have on a typical day when you are drinking? None   Q3: How often do you have six or more drinks on one occasion? Never   [employer]   In the past 12 months has the electric, gas, oil, or water company threatened to shut off services in your home? No   Health Literacy   How often do you need to have someone help you when you read instructions, pamphlets, or other written material from your doctor or pharmacy? Never   OTHER   Name(s) of People in Home Spouse, Troy Galvan     Assessment completed in patient's room.  Spouse, Troy, at bedside.  He will assist with care and provide transportation at discharge.     PCP- patient does not currently have one and is not interested with obtaining one at this time  Pharmacy-[facility]    No discharge needs identified.  Patient will discharge home/self-care.

## 2024-08-07 LAB — PSYCHE PATHOLOGY RESULT: NORMAL

## 2024-08-07 RX ORDER — OXYCODONE HYDROCHLORIDE 5 MG/1
5 TABLET ORAL EVERY 8 HOURS PRN
Qty: 10 TABLET | Refills: 0 | Status: SHIPPED | OUTPATIENT
Start: 2024-08-07

## 2024-08-09 DIAGNOSIS — C51.9 VULVAR CANCER, CARCINOMA: Primary | ICD-10-CM

## 2024-08-09 NOTE — PROGRESS NOTES
I called Ms. Galvan on 8/7 to check on her postop recovery and review her pathology results.  She is doing reasonably well and seems to be recovering appropriately at this point after her simple vulvectomy though she is having enough vulvar discomfort to require continued use of oxycodone. We discussed her pathology results which demonstrated more extensive and invasive cancer than her mapping biopsies revealed.  With margins involved with invasive cancer, will get PET CT for further evaluation and consider further treatment based on those results.

## 2024-08-13 ENCOUNTER — TELEPHONE (OUTPATIENT)
Dept: GYNECOLOGIC ONCOLOGY | Facility: CLINIC | Age: 64
End: 2024-08-13
Payer: MEDICAID

## 2024-08-13 DIAGNOSIS — C51.9 VULVAR CANCER, CARCINOMA: Primary | ICD-10-CM

## 2024-08-13 DIAGNOSIS — L90.0 LICHEN SCLEROSUS ET ATROPHICUS: ICD-10-CM

## 2024-08-13 NOTE — TELEPHONE ENCOUNTER
Slides from 8/5/24 pathology requested via fax from [facility] to be mailed to main campus Pathology. Confirmation of fax being sent received at 9:29am. Navigator will follow to ensure that slides received and processed.

## 2024-08-23 ENCOUNTER — HOSPITAL ENCOUNTER (OUTPATIENT)
Dept: RADIOLOGY | Facility: HOSPITAL | Age: 64
Discharge: HOME | End: 2024-08-23
Attending: OBSTETRICS & GYNECOLOGY
Payer: MEDICAID

## 2024-08-23 DIAGNOSIS — C51.9 VULVAR CANCER, CARCINOMA: ICD-10-CM

## 2024-08-23 PROCEDURE — A9552 F18 FDG: HCPCS | Performed by: OBSTETRICS & GYNECOLOGY

## 2024-08-23 PROCEDURE — 78815 PET IMAGE W/CT SKULL-THIGH: CPT | Mod: TC

## 2024-08-23 RX ORDER — FLUDEOXYGLUCOSE F18 500 MCI/ML
10 INJECTION INTRAVENOUS
Status: COMPLETED | OUTPATIENT
Start: 2024-08-23 | End: 2024-08-23

## 2024-08-23 RX ADMIN — FLUDEOXYGLUCOSE F-18 9.8 MILLICURIE: 500 INJECTION INTRAVENOUS at 01:08

## 2024-08-28 ENCOUNTER — TELEPHONE (OUTPATIENT)
Dept: UROGYNECOLOGY | Facility: CLINIC | Age: 64
End: 2024-08-28
Payer: MEDICAID

## 2024-08-28 ENCOUNTER — TUMOR BOARD CONFERENCE (OUTPATIENT)
Dept: UROGYNECOLOGY | Facility: CLINIC | Age: 64
End: 2024-08-28
Payer: MEDICAID

## 2024-08-28 NOTE — TELEPHONE ENCOUNTER
Called patient to discuss her PET scan results which did not show any evidence of spread of disease outside the known area on her vulva. I also discussed her pathology. I explained that with her particular diagnosis it can be difficult to discern or differentiate invasive carcinoma vs VIN3 as well as depth of invasion. After our internal review of her slides, it appears that depth of invasion is not as involved as original pathology report. However, we have sent the samples to [facility] for a 3rd opinion and will call with updates. Look forward to upcoming post-op visit.

## 2024-08-28 NOTE — PROGRESS NOTES
Multidisciplinary Gynecologic Oncology Cancer Conference - Evaluation and Recommendation Summary  Patient Name: Mari Galvan  : 1960  MRN: [id number]     1. Evaluation  64 y.o.  with long history of  lichen sclerosis. Her initial consultation was 2022. At that time, I started her on a 3 month treatment plan of high potency steroid. She followed up 8/3/2022 with dramatic improvement, but did not follow-up again after that until 2024. At that time she had several month worsening of her vulvar symptoms that were refractory to steroid ointment.    Exam showed a thickened almost papillary disfigurement of normal anatomy.   7/10/2024- EUA and partial Vulvectomy   Left labium minus- SAL, simple type in a background of hyperplastic lichen sclerosis  Left upper labium minus- SAL simplex with prominent verrucous hyperplasia  Right labium- SCC, well differentiated, very superficially invasive. Depth of invasion < 0.2 mm  2024- Partial vulvectomy and partial vaginectomy  Vulva/vaginal excisional biopsy- SCC well differentiated partially verrucous. Depth of invasion 4mm. LVI no identified.  Carcinoma involves the 3 to 9 o'clock circumferential margin. Deep margin broadly involved at 6-9' quadrant. SAL involves the 9-12 o'clock circumferential margin  Posterior vaginal margin- SCC partially verrucous at the circumferential margin.     24 PET: Vulvar region avid hypermetabolism is consistent with biopsy-proven carcinoma. There are no metastatic findings.     2. Treatment Recommendation  After pathology review, there is a question regarding depth of actual invasion and what constitutes invasive cancer versus dVIN.  This specimen is being sent to [facility] for expert outside pathology consultation.  Further treatment will be pending this review.      Gregory Kelly MD

## 2024-09-04 NOTE — PROGRESS NOTES
"REFERRING PROVIDER  Emeka Sandoval MD     INTERVAL HISTORY  CC: post-op     Mari Galvan is a 64 y.o.  with long hx of lichen sclerosis and recent diagnosis of possible SCC s/p partial vulvectomy on 24 with Dr. Kelly. Her case was reviewed at TB conference, path sent off to [place]. PET with no evidence of metastatic disease. Patient presents today for post-op appointment.     HPI or ONCOLOGIC HISTORY  Referred with with severe lichen sclerosus.  She states that it got worse from  to  with paresthesias (pins and needles) and pruritis.  She also felt like her vagina was closing off, unable to have intercourse since her laser treatment 10/20/2021.  She has a h/o LORI/BSO.      2022 - Received records that indicate patient seen 2020 for annual exam with concern for "itching and odor - no relief with monistat; intense itching at labia".  She was treated with twice daily clobetasol for 2 weeks and returned for biopsy detailed below.  Last visit note from 10/2/2020 when she was directed to continue clobetasol and estrace.    Data Reviewed   2020 Vulvar Biopsy:  Lichenoid dermatitis with features worrisome for lichen sclerosis et atrophicus     2022  - 4;  CEA - 0.9    7/10/2024- EUA and partial Vulvectomy   Left labium minus- SAL, simple type in a background of hyperplastic lichen sclerosis  Left upper labium minus- SAL simplex with prominent verrucous hyperplasia  Right labium- SCC, well differentiated, very superficially invasive. Depth of invasion < 0.2 mm    24 WLE, partial vulvectomy, partial vaginectomy:   FINDINGS: The right labia minora was not visible but the vulvar epithelium in that area was thickened and plaque-like. The left labia minora was thickened and hypertrophied with extensive fine papillary or verrucous changes extending circumferentially. The pathologic changes extended 2-3 cm into the vagina and involved the tissue between the clitoris and anterior " aspect of the urethra.   PATHOLOGY:Vulva/vaginal excisional biopsy- SCC well differentiated partially verrucous. Depth of invasion 4mm. LVI no identified.  Carcinoma involves the 3 to 9 o'clock circumferential margin. Deep margin broadly involved at 6-9' quadrant. SAL involves the 9-12 o'clock circumferential margin. Posterior vaginal margin- SCC partially verrucous at the circumferential margin.    8/9/24 PET: Vulvar region avid hypermetabolism is consistent with biopsy-proven carcinoma. There are no metastatic findings.     8/28/24 TB conference After pathology review, there is a question regarding depth of actual invasion and what constitutes invasive cancer versus dVIN.  This specimen is being sent to [facility] for expert outside pathology consultation.  Further treatment will be pending this review      OBJECTIVE   There were no vitals filed for this visit.       There is no height or weight on file to calculate BMI.     Physical Exam:   Constitutional: She is oriented to person, place, and time. She appears well-developed and well-nourished. No distress.    HENT:   Head: Normocephalic and atraumatic.    Eyes: Conjunctivae and EOM are normal.      Pulmonary/Chest: Effort normal. No respiratory distress.        Abdominal: Soft. She exhibits no distension and no mass. There is no abdominal tenderness. There is no rebound and no guarding. No hernia.     Genitourinary:          Genitourinary Comments: Moist pink granulation tissue noted to b/l labia minora/vaginal introitus. Healing well from surgery.                  Neurological: She is alert and oriented to person, place, and time.    Skin: No rash noted.    Psychiatric: She has a normal mood and affect. Her behavior is normal.     ECOG status: 0    LABORATORY DATA  Lab data reviewed.    RADIOLOGICAL DATA  Radiology data reviewed.    PATHOLOGY DATA  Pathology data reviewed.    ASSESSMENT    1. Lichen sclerosus et atrophicus    2. Primary vulvar squamous cell  carcinoma         PLAN  Follow-up [facility] pathology review. Further treatment and follow-up is pending this review. Will call patient with updates on plan and will make follow up appointment at this time.     Dana Landrum PA-C   Gynecology Oncology    Pelvic exam was done requiring a female chaperone

## 2024-09-05 ENCOUNTER — OFFICE VISIT (OUTPATIENT)
Dept: GYNECOLOGIC ONCOLOGY | Facility: CLINIC | Age: 64
End: 2024-09-05
Payer: MEDICAID

## 2024-09-05 VITALS
WEIGHT: 201.5 LBS | DIASTOLIC BLOOD PRESSURE: 73 MMHG | SYSTOLIC BLOOD PRESSURE: 129 MMHG | BODY MASS INDEX: 32.38 KG/M2 | HEIGHT: 66 IN | HEART RATE: 74 BPM

## 2024-09-05 DIAGNOSIS — L90.0 LICHEN SCLEROSUS ET ATROPHICUS: Primary | ICD-10-CM

## 2024-09-05 DIAGNOSIS — C51.9 PRIMARY VULVAR SQUAMOUS CELL CARCINOMA: ICD-10-CM

## 2024-09-05 PROCEDURE — 3074F SYST BP LT 130 MM HG: CPT | Mod: CPTII,S$GLB,,

## 2024-09-05 PROCEDURE — 3078F DIAST BP <80 MM HG: CPT | Mod: CPTII,S$GLB,,

## 2024-09-05 PROCEDURE — 99024 POSTOP FOLLOW-UP VISIT: CPT | Mod: S$GLB,,,

## 2024-09-05 PROCEDURE — 1159F MED LIST DOCD IN RCRD: CPT | Mod: CPTII,S$GLB,,

## 2024-09-19 ENCOUNTER — TUMOR BOARD CONFERENCE (OUTPATIENT)
Dept: UROGYNECOLOGY | Facility: CLINIC | Age: 64
End: 2024-09-19
Payer: MEDICAID

## 2024-09-20 DIAGNOSIS — C51.9 PRIMARY VULVAR SQUAMOUS CELL CARCINOMA: Primary | ICD-10-CM

## 2024-09-23 NOTE — PROGRESS NOTES
Multidisciplinary Gynecologic Oncology Cancer Conference - Evaluation and Recommendation Summary  Patient Name: Mari Galvan  : 1960  MRN: [id number]     HPI:  64 y.o.  with long history of lichen sclerosis. Her initial consultation was 2022. At that time, she was started her on a 3 month treatment plan of high potency steroid. She followed up 8/3/2022 with dramatic improvement, but did not follow-up again after that until 2024. At that time she had several month worsening of her vulvar symptoms that were refractory to steroid ointment.     Data Reviewed:  2020 Vulvar Biopsy:  Lichenoid dermatitis with features worrisome for lichen sclerosis et atrophicus (Dr. Sandoval)     7/10/2024- EUA and partial Vulvectomy 1. Left labium minus- SAL, simple type in a background of hyperplastic lichen sclerosis 2. Left upper labium minus- SAL simplex with prominent verrucous hyperplasia 3. Right labium- SCC, well differentiated, very superficially invasive. Depth of invasion < 0.2 mm     2024- Partial superficial vulvectomy and partial vaginectomy  FINDINGS:  The right labia minora was not visible but the vulvar epithelium in that area was thickened and plaque-like. The left labia minora was thickened and hypertrophied with extensive fine papillary or verrucous changes extending circumferentially. The pathologic changes extended 2-3 cm into the vagina and involved the tissue between the clitoris and anterior aspect of the urethra.   PATHOLOGY: 1. Multifocally invasive well-differentiated squamous cell carcinoma (depth of invasion 3 mm). No lymphovascular invasion identified. Multiple margins involved by carcinoma (see comment). 2. Vagina, additional posterior margin: Involved by well-differentiated squamous cell carcinoma with verrucous features.    Imagin24 PET: Vulvar region avid hypermetabolism is consistent with biopsy-proven carcinoma. There are no metastatic findings.     2024  Tumor Board - After pathology review, there is a question regarding depth of actual invasion and what constitutes invasive cancer versus dVIN. This specimen is being sent to [facility] for expert outside pathology consultation. Further treatment will be pending this review.      9/18/24 Tumor Board -   [place] FINAL DIAGNOSIS  Interpretation  1. Vulva and vagina, excisional biopsy: Multifocally invasive well-differentiated squamous cell carcinoma (depth of invasion 3 mm). No lymphovascular invasion identified. Multiple margins involved by carcinoma  (see comment).  2. Vagina, additional posterior margin: Involved by well-differentiated squamous cell carcinoma with verrucous features.    Comment: It is my understanding that the patient is a 64-  year-old woman with a 3.2 cm vulvar/vaginal mass (per submitted gross report). I agree with your morphologic assessment. Sections show an atypical, verrucous squamous proliferation with hyperkeratosis,parakeratosis, hypo-granulosis, and large expansile rete pushing into the underlying sub-epithelial tissues. A variably dense lichenoid lymphoid infiltrate is associated with this squamous proliferation. Cytologically, the majority of the lesion show a mild hyper-eosinophilic/glassy atypia but focally becomes more severe. Several foci show angulated squamous nests which, in my opinion, are sufficient for invasion. Multiple margins are positive for carcinoma including invasive carcinoma involving the 3?9 o'clock circumferential  margin and 6?9 o'clock deep margin (as indicated by the submitted report). The submitted Overall, I believe this tumor is best classified as well-differentiated squamous cell carcinoma with verrucous features. While verrucous carcinoma was also considered, the degree of atypia present exceeds what isexpected of verrucous carcinoma.      2. Treatment Recommendation  After second pathology review ([facility]), consensus recommendation is for inguinofemoral  lymph node dissection followed by adjuvant radiotherapy.  The benefits of this approach are potentially therapeutic if inguinal nodes are involved as well as allowing more precise dosing of radiation to the groins.  The concerns include going through another major surgery with associated risks including but not limited to wound infection, poor healing, and lymphedema while needing radiation regardless.  Alternatively, the patient could opt to forego surgery and move forward with adjuvant radiotherapy accepting potential loss of some therapeutic benefit of surgery noted above.      Gregory Kelly MD

## 2024-09-30 ENCOUNTER — CLINICAL SUPPORT (OUTPATIENT)
Dept: RADIATION THERAPY | Facility: HOSPITAL | Age: 64
End: 2024-09-30
Attending: RADIOLOGY
Payer: MEDICAID

## 2024-09-30 DIAGNOSIS — C51.9 PRIMARY VULVAR SQUAMOUS CELL CARCINOMA: ICD-10-CM

## 2024-09-30 PROCEDURE — 77334 RADIATION TREATMENT AID(S): CPT | Performed by: RADIOLOGY

## 2024-10-04 ENCOUNTER — TELEPHONE (OUTPATIENT)
Dept: GYNECOLOGIC ONCOLOGY | Facility: CLINIC | Age: 64
End: 2024-10-04
Payer: MEDICAID

## 2024-10-04 NOTE — TELEPHONE ENCOUNTER
"Pt contacted navigator with update that she no longer has her Medicaid coverage. She saw Dr Bush on 9/30 (the last day of her Medicaid coverage) and now has no medical insurance and no source of income. She wants to get treatment but will need financial assistance. Pt reports that Medicaid "dropped her" because of a false report of income. Pt states that she is attempting to obtain Medicaid again but is not optimistic that they will work very timely to ensure her coverage. Navigator will attempt to connect and set up pt with Financial Assistance.      "

## 2024-10-08 NOTE — TELEPHONE ENCOUNTER
Pt contacted office with update that her Medicaid is active again. Pt will contact Dr Bush to arrange visit/treatment. Navigator will monitor treatments to ensure GYN /ONC follow up is arranged at a time appropriate date.

## 2024-12-19 DIAGNOSIS — C51.9 MALIGNANT NEOPLASM OF VULVA, UNSPECIFIED: Primary | ICD-10-CM

## 2025-02-04 ENCOUNTER — OFFICE VISIT (OUTPATIENT)
Dept: GYNECOLOGIC ONCOLOGY | Facility: CLINIC | Age: 65
End: 2025-02-04
Payer: MEDICAID

## 2025-02-04 ENCOUNTER — LAB VISIT (OUTPATIENT)
Dept: LAB | Facility: HOSPITAL | Age: 65
End: 2025-02-04
Attending: OBSTETRICS & GYNECOLOGY
Payer: MEDICAID

## 2025-02-04 VITALS
DIASTOLIC BLOOD PRESSURE: 79 MMHG | HEIGHT: 66 IN | SYSTOLIC BLOOD PRESSURE: 120 MMHG | BODY MASS INDEX: 35.1 KG/M2 | HEART RATE: 80 BPM | WEIGHT: 218.38 LBS

## 2025-02-04 DIAGNOSIS — L90.0 LICHEN SCLEROSUS ET ATROPHICUS: ICD-10-CM

## 2025-02-04 DIAGNOSIS — Z76.89 ENCOUNTER TO ESTABLISH CARE: ICD-10-CM

## 2025-02-04 DIAGNOSIS — C51.9 PRIMARY VULVAR SQUAMOUS CELL CARCINOMA: ICD-10-CM

## 2025-02-04 DIAGNOSIS — R53.83 FATIGUE, UNSPECIFIED TYPE: ICD-10-CM

## 2025-02-04 DIAGNOSIS — C51.9 PRIMARY VULVAR SQUAMOUS CELL CARCINOMA: Primary | ICD-10-CM

## 2025-02-04 LAB
ALBUMIN SERPL-MCNC: 4.3 G/DL (ref 3.4–4.8)
ALBUMIN/GLOB SERPL: 1.2 RATIO (ref 1.1–2)
ALP SERPL-CCNC: 81 UNIT/L (ref 40–150)
ALT SERPL-CCNC: 40 UNIT/L (ref 0–55)
ANION GAP SERPL CALC-SCNC: 10 MEQ/L
AST SERPL-CCNC: 42 UNIT/L (ref 5–34)
BASOPHILS # BLD AUTO: 0.04 X10(3)/MCL
BASOPHILS NFR BLD AUTO: 1.3 %
BILIRUB SERPL-MCNC: 1.1 MG/DL
BUN SERPL-MCNC: 17.3 MG/DL (ref 9.8–20.1)
CALCIUM SERPL-MCNC: 9.4 MG/DL (ref 8.4–10.2)
CHLORIDE SERPL-SCNC: 103 MMOL/L (ref 98–107)
CO2 SERPL-SCNC: 28 MMOL/L (ref 23–31)
CREAT SERPL-MCNC: 1.39 MG/DL (ref 0.55–1.02)
CREAT/UREA NIT SERPL: 12
EOSINOPHIL # BLD AUTO: 0.1 X10(3)/MCL (ref 0–0.9)
EOSINOPHIL NFR BLD AUTO: 3.4 %
ERYTHROCYTE [DISTWIDTH] IN BLOOD BY AUTOMATED COUNT: 14.9 % (ref 11.5–17)
GFR SERPLBLD CREATININE-BSD FMLA CKD-EPI: 43 ML/MIN/1.73/M2
GLOBULIN SER-MCNC: 3.6 GM/DL (ref 2.4–3.5)
GLUCOSE SERPL-MCNC: 83 MG/DL (ref 82–115)
HCT VFR BLD AUTO: 31.4 % (ref 37–47)
HGB BLD-MCNC: 10.5 G/DL (ref 12–16)
IMM GRANULOCYTES # BLD AUTO: 0.08 X10(3)/MCL (ref 0–0.04)
IMM GRANULOCYTES NFR BLD AUTO: 2.7 %
LYMPHOCYTES # BLD AUTO: 0.57 X10(3)/MCL (ref 0.6–4.6)
LYMPHOCYTES NFR BLD AUTO: 19.1 %
MCH RBC QN AUTO: 35.5 PG (ref 27–31)
MCHC RBC AUTO-ENTMCNC: 33.4 G/DL (ref 33–36)
MCV RBC AUTO: 106.1 FL (ref 80–94)
MONOCYTES # BLD AUTO: 0.26 X10(3)/MCL (ref 0.1–1.3)
MONOCYTES NFR BLD AUTO: 8.7 %
NEUTROPHILS # BLD AUTO: 1.93 X10(3)/MCL (ref 2.1–9.2)
NEUTROPHILS NFR BLD AUTO: 64.8 %
NRBC BLD AUTO-RTO: 0 %
PLATELET # BLD AUTO: 226 X10(3)/MCL (ref 130–400)
PMV BLD AUTO: 9.6 FL (ref 7.4–10.4)
POTASSIUM SERPL-SCNC: 4.2 MMOL/L (ref 3.5–5.1)
PROT SERPL-MCNC: 7.9 GM/DL (ref 5.8–7.6)
RBC # BLD AUTO: 2.96 X10(6)/MCL (ref 4.2–5.4)
SODIUM SERPL-SCNC: 141 MMOL/L (ref 136–145)
TSH SERPL-ACNC: 225.29 UIU/ML (ref 0.35–4.94)
WBC # BLD AUTO: 2.98 X10(3)/MCL (ref 4.5–11.5)

## 2025-02-04 PROCEDURE — 3074F SYST BP LT 130 MM HG: CPT | Mod: CPTII,S$GLB,, | Performed by: OBSTETRICS & GYNECOLOGY

## 2025-02-04 PROCEDURE — 36415 COLL VENOUS BLD VENIPUNCTURE: CPT

## 2025-02-04 PROCEDURE — 85025 COMPLETE CBC W/AUTO DIFF WBC: CPT

## 2025-02-04 PROCEDURE — 99215 OFFICE O/P EST HI 40 MIN: CPT | Mod: S$GLB,,, | Performed by: OBSTETRICS & GYNECOLOGY

## 2025-02-04 PROCEDURE — 3008F BODY MASS INDEX DOCD: CPT | Mod: CPTII,S$GLB,, | Performed by: OBSTETRICS & GYNECOLOGY

## 2025-02-04 PROCEDURE — 3078F DIAST BP <80 MM HG: CPT | Mod: CPTII,S$GLB,, | Performed by: OBSTETRICS & GYNECOLOGY

## 2025-02-04 PROCEDURE — 80053 COMPREHEN METABOLIC PANEL: CPT

## 2025-02-04 PROCEDURE — 1160F RVW MEDS BY RX/DR IN RCRD: CPT | Mod: CPTII,S$GLB,, | Performed by: OBSTETRICS & GYNECOLOGY

## 2025-02-04 PROCEDURE — 99459 PELVIC EXAMINATION: CPT | Mod: S$GLB,,, | Performed by: OBSTETRICS & GYNECOLOGY

## 2025-02-04 PROCEDURE — 1159F MED LIST DOCD IN RCRD: CPT | Mod: CPTII,S$GLB,, | Performed by: OBSTETRICS & GYNECOLOGY

## 2025-02-04 PROCEDURE — 84443 ASSAY THYROID STIM HORMONE: CPT

## 2025-02-04 NOTE — PROGRESS NOTES
"REFERRING PROVIDER  Emeka Sandoval MD     INTERVAL HISTORY  CC: follow up after radiation     Mari Galvan is a 64 y.o. with Stage IIG1 vulvar carcinoma s/p partial vulvectomy on 8/5/24. After second pathology review at [facility] recommendations were for inguinofemoral LND followed by adjuvant radiation. Patient decided to forego further surgery and agreed to adjuvant RT. She had some delays in starting treatment due to insurance. She completed vulvar and IFLN radiation on 11/27/24.     Since completing radiation she has grade 2 fatigue (not relieved by rest; limiting ADLs). She is also gaining weight, states her face is swollen, and has trouble speaking audibly some times (very vague non-specific descripition). She has no vaginal bleeding or discharge.  She is having no nausea or vomiting.  She has no bowel or bladder concerns.  She has no pain issues.  She is resuming her normal activities.    HPI or ONCOLOGIC HISTORY  Referred with with severe lichen sclerosus.  She states that it got worse from 2021 to 2022 with paresthesias (pins and needles) and pruritis.  She also felt like her vagina was closing off, unable to have intercourse since her laser treatment 10/20/2021.  She has a h/o LORI/BSO.      6/30/2022 - Received records that indicate patient seen 9/11/2020 for annual exam with concern for "itching and odor - no relief with monistat; intense itching at labia".  She was treated with twice daily clobetasol for 2 weeks and returned for biopsy detailed below.  Last visit note from 10/2/2020 when she was directed to continue clobetasol and estrace.    Data Reviewed   9/25/2020 Vulvar Biopsy:  Lichenoid dermatitis with features worrisome for lichen sclerosis et atrophicus     5/6/2022  - 4;  CEA - 0.9    7/10/2024- EUA and partial Vulvectomy   Left labium minus- SAL, simple type in a background of hyperplastic lichen sclerosis  Left upper labium minus- SAL simplex with prominent verrucous " hyperplasia  Right labium- SCC, well differentiated, very superficially invasive. Depth of invasion < 0.2 mm    8/5/24 WLE, partial vulvectomy, partial vaginectomy:   FINDINGS: The right labia minora was not visible but the vulvar epithelium in that area was thickened and plaque-like. The left labia minora was thickened and hypertrophied with extensive fine papillary or verrucous changes extending circumferentially. The pathologic changes extended 2-3 cm into the vagina and involved the tissue between the clitoris and anterior aspect of the urethra.   PATHOLOGY:Vulva/vaginal excisional biopsy- SCC well differentiated partially verrucous. Depth of invasion 4mm. LVI not identified.  Carcinoma involves the 3 to 9 o'clock circumferential margin. Deep margin broadly involved at 6-9' quadrant. SAL involves the 9-12 o'clock circumferential margin. Posterior vaginal margin- SCC partially verrucous at the circumferential margin. [place]: Stage IIG1 vulvar carcinoma with 3 mm of invasion and multifocal positive margins.     8/9/24 PET: Vulvar region avid hypermetabolism is consistent with biopsy-proven carcinoma. There are no metastatic findings.     8/28/24 and 9/23/2024 TB conference After pathology review, there is a question regarding depth of actual invasion and what constitutes invasive cancer versus dVIN.  This specimen was sent to [facility] for expert outside pathology consultation. Based on this, recommendation was for IFLND followed by vulvar and groin radiation.  (Patient declined IFLND)        10/17/24-11/27/24 radiation therapy    OBJECTIVE   Vitals:    02/04/25 0850   BP: 120/79   Pulse: 80          Body mass index is 35.25 kg/m².     Physical Exam:   Constitutional: She is oriented to person, place, and time. She appears well-developed and well-nourished. No distress.    HENT:   Head: Normocephalic and atraumatic.    Eyes: Conjunctivae and EOM are normal.      Pulmonary/Chest: Effort normal. No respiratory  distress.        Abdominal: Soft. She exhibits no distension and no mass. There is no abdominal tenderness. There is no rebound and no guarding. No hernia.     Genitourinary:          Genitourinary Comments: Healed well from surgery and radiation.  Possible early recurrent lichenoid changes                 Neurological: She is alert and oriented to person, place, and time.    Skin: No rash noted.    Psychiatric: She has a normal mood and affect. Her behavior is normal.     ECOG status: 0    LABORATORY DATA  Lab data reviewed.    RADIOLOGICAL DATA  Radiology data reviewed.    PATHOLOGY DATA  Pathology data reviewed.    ASSESSMENT    1. Primary vulvar squamous cell carcinoma    2. Lichen sclerosus et atrophicus    3. Fatigue, unspecified type    Doing well.  This is her first visit with me since completing adjuvant therapy.  She appears to have healed well and will now enter into surveillance.  She has fatigue out of proportion to what I would expect at this point after her cancer treatment.  Will do a basic lab evaluation, but patient should see a PCP to further evaluate.    NCCN guidelines recommend monitoring patient's with physical examinations every 3-6 months for the first 2 years and the at increasing intervals.  Because of the propensity for local recurrence, regular and long-term careful examination of the vulvar and groin constitute cornerstone a posttreatment surveillance for these patients.  This should include careful visual inspection of the vulva, skin bridge, and inguinal lymph nodes.  Because a significant number vulvar cancers are human papilloma virus associated, such examination she survey not only for vulvar recurrence or multifocal vulvar cancer but also for cervical, vaginal, and perianal neoplasia.  SGO 2017 guidelines suggest following the cervical cancer surveillance paradigm. For high risk disease, patient should be seen every 3 months for the first 2 years and then every 6 months in years 2  through 5 followed by annual visits.    PLAN  Will follow-up on labs from today  Follow-up PET 3/7/2025  Follow-up with Dr Bush 3/13/2025  Recommend finding a PCP  Follow up every 3 months. Alternate visits with Dr. Eleazar Kelly MD     ONGOING COMPLEXITY BILLING  Visit today is associated with current or anticipated ongoing medical care related to this patients single serious condition/complex condition: vulva cancer     Pelvic exam was done requiring a female chaperone

## 2025-02-07 ENCOUNTER — TELEPHONE (OUTPATIENT)
Dept: GYNECOLOGIC ONCOLOGY | Facility: CLINIC | Age: 65
End: 2025-02-07
Payer: MEDICAID

## 2025-02-07 DIAGNOSIS — E03.9 HYPOTHYROIDISM, UNSPECIFIED TYPE: Primary | ICD-10-CM

## 2025-02-07 DIAGNOSIS — R53.83 FATIGUE, UNSPECIFIED TYPE: ICD-10-CM

## 2025-02-07 RX ORDER — LEVOTHYROXINE SODIUM 50 UG/1
50 TABLET ORAL
Qty: 30 TABLET | Refills: 11 | Status: SHIPPED | OUTPATIENT
Start: 2025-02-07 | End: 2026-02-07

## 2025-02-07 NOTE — PROGRESS NOTES
I called Ms. Galvan and informed her of her very elevated TSH. She said she had a history of thyroid issues but never had to take medicine. I told her I needed to start her on something and she stated that she did take something for about a month and couldn't tell any difference so threw it away. I read her the long list of issues that can occur with chronic hypothyroidism and impressed upon her that she needed to start taking Synthroid (ASAP).  She agreed to start. We will recheck her TSH and T4 in a month.  Hopefully, her referral to a PCP will go through by that time and they can take over monitoring her thyroid function and medication dosing.

## 2025-02-07 NOTE — TELEPHONE ENCOUNTER
Pt contacted and scheduled for repeat thyroid labs. Pt can not go today to get labs done because she is at work. Pt will go Monday to obtain labs. Pt will  and start her synthroid tomorrow (Saturday). Repeat labs scheduled for 3/7/25 prior to her scheduled PET. Navigator updated pt that PCP referral will be sent to a provider near her. She needs to get in with a doctor that can manage her thyroid levels and medications long term. Pt verbalized understanding.

## 2025-02-07 NOTE — TELEPHONE ENCOUNTER
----- Message from Gregory Kelly MD sent at 2/7/2025  1:50 PM CST -----  I called Ms. Galvan and informed her of her very elevated TSH. She said she had a history of thyroid issues but never had to take medicine. I told her I needed to start her on something and she stated that she did take something for about a month and couldn't tell any difference so threw it away. I read her the long list of issues that can occur with chronic hypothyroidism and impressed upon her that she needed to start taking Synthroid (ASAP).  She agreed to start. We will recheck her TSH and T4 in a month.  Hopefully, her referral to a PCP will go through by that time and they can take over monitoring her thyroid function and medication dosing.

## 2025-02-10 ENCOUNTER — LAB VISIT (OUTPATIENT)
Dept: LAB | Facility: HOSPITAL | Age: 65
End: 2025-02-10
Attending: OBSTETRICS & GYNECOLOGY
Payer: MEDICAID

## 2025-02-10 ENCOUNTER — TELEPHONE (OUTPATIENT)
Dept: UROGYNECOLOGY | Facility: CLINIC | Age: 65
End: 2025-02-10
Payer: MEDICAID

## 2025-02-10 DIAGNOSIS — R53.83 FATIGUE, UNSPECIFIED TYPE: ICD-10-CM

## 2025-02-10 DIAGNOSIS — E03.9 HYPOTHYROIDISM, UNSPECIFIED TYPE: ICD-10-CM

## 2025-02-10 LAB
T4 FREE SERPL-MCNC: <0.42 NG/DL (ref 0.7–1.48)
TSH SERPL-ACNC: 209.51 UIU/ML (ref 0.35–4.94)

## 2025-02-10 PROCEDURE — 36415 COLL VENOUS BLD VENIPUNCTURE: CPT

## 2025-02-10 PROCEDURE — 84439 ASSAY OF FREE THYROXINE: CPT

## 2025-02-10 PROCEDURE — 84443 ASSAY THYROID STIM HORMONE: CPT

## 2025-02-10 NOTE — TELEPHONE ENCOUNTER
Spoke with patient and let her know that Dr. Wong put a referral in for her to see a PCP. I sent the referral to [facility], they will be reaching out to her to get her scheduled. She verbalized understanding.

## 2025-02-17 ENCOUNTER — TELEPHONE (OUTPATIENT)
Dept: UROGYNECOLOGY | Facility: CLINIC | Age: 65
End: 2025-02-17
Payer: MEDICAID

## 2025-02-17 NOTE — TELEPHONE ENCOUNTER
Spoke with [facility]. Patient has appointment scheduled on 3/10 @ 1:30.

## 2025-02-27 NOTE — PRE-PROCEDURE INSTRUCTIONS
"[facility]: Outpatient Surgery  Preprocedure Instructions    Expectations: "Because of inconsistent procedure completion times, an unexpected wait may occur. The physicians would like you to be here to prepare in the event they run ahead of time. We will make you as comfortable as possible and keep you informed. We apologize in advance if this happens."     Your arrival time for your surgery or procedure is __9:00am___.  We ask patients to arrive about 2 hours before surgery to allow for enough time to review your health history & medications, start your IV, complete any outstanding labwork or tests, and meet your Anesthesiologist.    We are located at [facility], [address] [place] [address].    Enter through the [facility] entrance next to the Emergency Room, and come to the 6th floor to the Outpatient Surgery Department.    If you are in need of a wheelchair the morning of surgery please call [phone] about 15 minutes before you arrive. Parking is available in our parking garage located off [facility], between the hospital and [facility].      Visitory Policy:  You are allowed 2 adult visitors to be with you in the hospital. Please, no switching visitors in pre-op area. All hospital visitors should be in good current health.  No small children.  We will update you and your family hourly on the progression of surgery and any unexpected delays.      What to Bring:  Please have your ID, insurance cards, and all home medication bottles with you at check in.  Bring your CPAP machine if one is used at home.     Fasting:  Nothing to eat or drink after midnight the night before your procedure. This includes no ice, gum, hard candies, and/or tobacco products.    Medications:  Follow your doctor's instructions for taking any medications on the morning of your procedure.  If no instructions for taking medications were given, do not take any medications but bring your " Subjective   Patient ID: Joana Morales is a 88 y.o. female.    Patient is a 87-year-old female with past medical history of CKD, COPD, diabetes, A-fib, anxiety, depression, hyperlipidemia, ambulatory dysfunction who was recently transferred from another skilled nursing facility.  Patient resides at long-term care and states no acute issues or concerns at this time.  Otherwise, states that she is tolerating her medications.  Denies any issues with moving into a SNF.  Overall feels well otherwise.         Review of Systems   Constitutional:  Positive for fatigue.   HENT: Negative.     Respiratory: Negative.     Cardiovascular: Negative.    Gastrointestinal: Negative.    Musculoskeletal: Negative.    Neurological:  Positive for weakness.   Psychiatric/Behavioral: Negative.         Objective Vitals Reviewed via facility EMR   Physical Exam  Constitutional:       General: She is not in acute distress.     Appearance: She is not ill-appearing.   Eyes:      Pupils: Pupils are equal, round, and reactive to light.   Cardiovascular:      Rate and Rhythm: Normal rate and regular rhythm.      Pulses: Normal pulses.      Heart sounds: No murmur heard.  Pulmonary:      Effort: No respiratory distress.      Breath sounds: No wheezing.   Abdominal:      General: Abdomen is flat. Bowel sounds are normal. There is no distension.   Musculoskeletal:      Right lower leg: No edema.      Left lower leg: No edema.   Skin:     General: Skin is warm and dry.   Neurological:      Mental Status: She is alert. Mental status is at baseline.      Cranial Nerves: No cranial nerve deficit.      Motor: Weakness present.   Psychiatric:         Mood and Affect: Mood normal.         Behavior: Behavior normal.         Assessment/Plan   Diagnoses and all orders for this visit:  Atrial fibrillation, unspecified type (Multi)  Hyperlipidemia, unspecified hyperlipidemia type  Hypertension, essential  Type 2 diabetes mellitus with hyperglycemia, with  long-term current use of insulin  Chronic obstructive pulmonary disease, unspecified COPD type (Multi)  Weakness  Patient seen and examined at bedside.  Overall medically stable continue to monitor for worsening dementia and delirium.  Continue optimization of blood sugars, medications reviewed and reconciled.  Discussed care plan with staff and no issues noted.  Otherwise, continue supportive care, optimize activities of daily living.  Otherwise no additional issues at this time.     Reviewed and approved by DOMINGO CRUM on 2/26/25 at 9:08 PM.        medications in their bottles to your procedure check in.     Follow your doctor's preoperative instructions regarding skin prep, bowel prep, bathing, or showering prior to your procedure.  If any special soaps were provided to you, please use according to your doctor's instructions. If no instructions were given from your doctor, take a good bath or shower with antibacterial soap the night before and the morning of your procedure.  On the morning of procedure, wear loose, comfortable clothing.  No lotions, makeup, perfumes, colognes, deodorant, or jewelry to your procedure.  Removable items (glasses, contact lenses, dentures, retainers, hearing aids) need to be removed for your procedure.  Bring your storage containers for these items if you wear them.     Artificial nails, body jewelry, eyelash extensions, and/or hair extensions with metal clips are not allowed during your surgery.  If you currently wear any of these items, please arrange for them to be removed prior to your arrival to the hospital.     Outpatient or Same Day Surgeries:  Any patients receiving sedation/anesthesia are advised not to drive for 24 hours after their procedure.  We do not allow patients to drive themselves home after discharge.  If you are going home after your procedure, please have someone available to drive you home from the hospital.     You may call the Outpatient Surgery Department at [phone] with any questions or concerns.  We are looking forward to meeting you and taking great care of you for your procedure.  Thank you for choosing [facility] for your surgical needs.

## 2025-03-07 ENCOUNTER — HOSPITAL ENCOUNTER (OUTPATIENT)
Dept: RADIOLOGY | Facility: HOSPITAL | Age: 65
Discharge: HOME | End: 2025-03-07
Attending: RADIOLOGY
Payer: MEDICAID

## 2025-03-07 DIAGNOSIS — C51.9 MALIGNANT NEOPLASM OF VULVA, UNSPECIFIED: ICD-10-CM

## 2025-03-07 PROCEDURE — A9552 F18 FDG: HCPCS | Performed by: RADIOLOGY

## 2025-03-07 PROCEDURE — 78815 PET IMAGE W/CT SKULL-THIGH: CPT | Mod: TC

## 2025-03-07 RX ORDER — FLUDEOXYGLUCOSE F18 500 MCI/ML
10 INJECTION INTRAVENOUS
Status: COMPLETED | OUTPATIENT
Start: 2025-03-07 | End: 2025-03-07

## 2025-03-07 RX ADMIN — FLUDEOXYGLUCOSE F-18 9.8 MILLICURIE: 500 INJECTION INTRAVENOUS at 07:03

## 2025-03-11 ENCOUNTER — RESULTS FOLLOW-UP (OUTPATIENT)
Dept: GYNECOLOGIC ONCOLOGY | Facility: CLINIC | Age: 65
End: 2025-03-11

## 2025-03-11 ENCOUNTER — TELEPHONE (OUTPATIENT)
Dept: GYNECOLOGIC ONCOLOGY | Facility: CLINIC | Age: 65
End: 2025-03-11
Payer: MEDICAID

## 2025-03-11 NOTE — TELEPHONE ENCOUNTER
----- Message from Gregory Kelly MD sent at 3/7/2025  4:15 PM CST -----  Excellent - can you just peek and make sure she follows through with that appointment. Otherwise, I need to follow-up and increase her dose. Thanks!  ----- Message -----  From: Lab, Background User  Sent: 3/7/2025   8:29 AM CST  To: Gregory Kelly MD

## 2025-03-11 NOTE — TELEPHONE ENCOUNTER
"Pt contacted to confirm that she established care with a PCP yesterday, as scheduled. PT admitted that she did not go because she "did not like" that provider/location. Pt now requesting a PCP that takes her insurance in [place].     Navigator stressed the importance of establishing care ASAP so that a  provider can manage and titrate her thyroid medications properly. Her last levels were still elevated and her medication will need to be adjusted and addressed while we continue to get her established with PCP.  Message will be sent to Dr Kelly with an update.  "

## 2025-03-12 NOTE — TELEPHONE ENCOUNTER
Pt contacted to relay Dr Kelly's recommendations to increase her Synthroid medication to 2 tabs a day to equal 100mcg daily. Pt currently taking 50mcg daily. Left detailed message for pt to begin taking 2 pills daily. Pt instructed that when it is time for a refill she can request a new prescription from us to correct the prescribing dosage to 100mcg daily. Pt also updated that her labs will be done monthly until we get her in with a PCP so we can continue to titrate her synthroid dosage and get her levels within a stable normal range. Lab appointment scheduled. Pt instructed to call navigator if date, time or location need to be adjusted.     Referral to [facility] to establish with a PCP sent.

## 2025-03-13 NOTE — TELEPHONE ENCOUNTER
Pt contacted navigator and confirmed that she would begin taking 2 pills everyday to equal a total dosage of 100mcg.

## 2025-03-20 DIAGNOSIS — E03.9 HYPOTHYROIDISM, UNSPECIFIED TYPE: ICD-10-CM

## 2025-03-20 RX ORDER — LEVOTHYROXINE SODIUM 50 UG/1
100 TABLET ORAL
Qty: 60 TABLET | Refills: 11 | Status: SHIPPED | OUTPATIENT
Start: 2025-03-20 | End: 2026-03-20

## 2025-04-01 ENCOUNTER — OFFICE VISIT (OUTPATIENT)
Dept: FAMILY MEDICINE | Facility: CLINIC | Age: 65
End: 2025-04-01
Payer: MEDICAID

## 2025-04-01 VITALS
DIASTOLIC BLOOD PRESSURE: 74 MMHG | HEIGHT: 66 IN | RESPIRATION RATE: 20 BRPM | BODY MASS INDEX: 33.81 KG/M2 | HEART RATE: 86 BPM | WEIGHT: 210.38 LBS | OXYGEN SATURATION: 96 % | SYSTOLIC BLOOD PRESSURE: 108 MMHG | TEMPERATURE: 98 F

## 2025-04-01 DIAGNOSIS — Z12.31 BREAST CANCER SCREENING BY MAMMOGRAM: ICD-10-CM

## 2025-04-01 DIAGNOSIS — E03.9 HYPOTHYROIDISM, UNSPECIFIED TYPE: ICD-10-CM

## 2025-04-01 DIAGNOSIS — D53.9 MACROCYTIC ANEMIA: ICD-10-CM

## 2025-04-01 DIAGNOSIS — N17.9 AKI (ACUTE KIDNEY INJURY): ICD-10-CM

## 2025-04-01 DIAGNOSIS — Z76.89 ENCOUNTER TO ESTABLISH CARE: Primary | ICD-10-CM

## 2025-04-01 PROCEDURE — 99214 OFFICE O/P EST MOD 30 MIN: CPT | Mod: PBBFAC

## 2025-04-01 NOTE — PROGRESS NOTES
[facility] OFFICE VISIT NOTE  Mari Galvan  [id number]  04/01/2025      Chief Complaint: Establish Care (No nelson noted  /C/o has elevated TSH levels in which levothyroxine increased to 2tabs in AM )      Mari Galvan is a 64 y.o. female  referred by GYN oncologist to [facility] to establish care.    Remote hx of hypothyroidism   -States had elevated TFT in her 20's but was never on medications  -she underwent partial vulvectomy/vaginectomy on 8/2024 and vulva IFLN radiation 11/2024; since completing her radiation, she has been having fatigue and gaining weight  -TSH was checked 2/2025  and was found to be elevated at 225  -She was initiated on synthroid 50mcg and TSH/T4 were checked (3/7/2025) and were 96/0.55 respectively; Her syntroid was increased to 100mcg which she said she started taking about a week ago    Macrocytic anemia picture  -H/H 10.5/31.4 abd  on lab 2/2025    SAE  -Cr 1.39 from 1.09 on lab 2/4/2025  -On hx of CKD      PMHx:  Vaginal and  vulvar SC carcinoma s/p partial vulvectomy/vaginectomy on 8/5/24 and vulva IFLN radiation   on 11/27/24.   Hx of lichen sclerosus   -Followed by DR Kelly (GYN onc) and Dr Sandoval (gen OBGYN)      HM  -Due for mammogram  -Due for C-scope ( states had previous scope >10yrs ago  -PAP; not indicated s/p LORI/BSO.      Current medications:  Current Outpatient Medications   Medication Instructions    levothyroxine (SYNTHROID) 100 mcg, Oral, Before breakfast       Review of Systems   Constitutional:  Positive for fatigue.   Respiratory:  Negative for chest tightness and shortness of breath.    Cardiovascular:  Negative for chest pain.   Gastrointestinal:  Negative for abdominal pain and blood in stool.   Genitourinary:  Negative for hematuria and vaginal bleeding.   Neurological:  Negative for dizziness and light-headedness.   Psychiatric/Behavioral:  Negative for suicidal ideas.        Blood pressure 108/74, pulse 86, temperature 97.9 °F (36.6 °C), temperature  "source Oral, resp. rate 20, height 5' 6" (1.676 m), weight 95.4 kg (210 lb 6.4 oz), SpO2 96%.   Physical Exam  Constitutional:       General: She is not in acute distress.  Eyes:      Pupils: Pupils are equal, round, and reactive to light.   Cardiovascular:      Rate and Rhythm: Normal rate and regular rhythm.      Pulses: Normal pulses.      Heart sounds: Normal heart sounds. No murmur heard.  Pulmonary:      Effort: Pulmonary effort is normal. No respiratory distress.      Breath sounds: Normal breath sounds. No wheezing.   Abdominal:      General: Bowel sounds are normal. There is no distension.      Tenderness: There is no abdominal tenderness. There is no guarding.   Musculoskeletal:      Right lower leg: No edema.      Left lower leg: No edema.   Skin:     General: Skin is warm.   Neurological:      Mental Status: She is alert and oriented to person, place, and time.   Psychiatric:         Mood and Affect: Mood normal.         Thought Content: Thought content normal.         Judgment: Judgment normal.       Assessment:   1. Encounter to establish care    2. Hypothyroidism, unspecified type   -Her synthroid was recently increased from 50 to 100mcg; therefore will plan to repeat lab next visit  -She did say she has labs scheduled on 4/7/2025; instructed to complete      3. SAE (acute kidney injury)   -Instructed on hydration; no NSAID  -Repeat BMP next visit     4. Macrocytic anemia   -Folate and B12 next visit     Mammogram ordered; discussed colonoscopy, she would like to think about it and will let me know next visit.    Follow up in month with labs     Cesar Mota MD  [facility]      "

## 2025-04-02 NOTE — PROGRESS NOTES
I have discussed the case with the resident and reviewed the resident's history and physical, assessment, plan, and progress note. I agree with the findings.       Jeronimo Fraga MD  [facility]

## 2025-04-07 ENCOUNTER — LAB VISIT (OUTPATIENT)
Dept: LAB | Facility: HOSPITAL | Age: 65
End: 2025-04-07
Attending: OBSTETRICS & GYNECOLOGY
Payer: MEDICAID

## 2025-04-07 DIAGNOSIS — R53.83 FATIGUE, UNSPECIFIED TYPE: ICD-10-CM

## 2025-04-07 DIAGNOSIS — E03.9 HYPOTHYROIDISM, UNSPECIFIED TYPE: ICD-10-CM

## 2025-04-07 LAB
T4 FREE SERPL-MCNC: 1.02 NG/DL (ref 0.7–1.48)
TSH SERPL-ACNC: 7.46 UIU/ML (ref 0.35–4.94)

## 2025-04-07 PROCEDURE — 84439 ASSAY OF FREE THYROXINE: CPT

## 2025-04-07 PROCEDURE — 84443 ASSAY THYROID STIM HORMONE: CPT

## 2025-04-07 PROCEDURE — 36415 COLL VENOUS BLD VENIPUNCTURE: CPT

## 2025-04-30 NOTE — PROGRESS NOTES
"[facility] OFFICE VISIT NOTE  Mari Galvan  [id number]  05/01/2025    Chief Complaint: Follow-up (Patient states she has the same feet swelling. )    Mari Galvan is a 64 y.o. female  presenting to [facility] for hypothyroidism management. See documentation 4/1/2025    TFT 4/1/2025 reviewed; TSH/T4 96/0.55-->7.46/1.02 respectively. Was on synthroid 100mcg, self downtitrated to 50mcg after she saw her labs were improving. Fatigue/tiredness is improving and her weight has been stable.    Chronic BLE edema R>L. Onset since surgery 8/2024. Improved/resolved with leg elevation and worse/recur with long standing. No pain or redness    PMHx  -Macrocytic anemia  -SAE  -Vaginal and  vulvar SC carcinoma s/p partial vulvectomy/vaginectomy on 8/5/24 and vulva IFLN radiation     HM  -MMG ordered last visit; not completed  -Due for C-scope ( states had previous scope >10yrs ago and no polyps were found); desires cologuard  -PAP; not indicated s/p LORI/BSO.       Current medications:  Current Outpatient Medications   Medication Instructions    folic acid (FOLVITE) 1 mg, Oral, Daily    levothyroxine (SYNTHROID) 100 mcg, Oral, Before breakfast       Review of Systems    As per HPI    Blood pressure 118/78, pulse 73, temperature 98.1 °F (36.7 °C), temperature source Oral, resp. rate 18, height 5' 6" (1.676 m), weight 94.9 kg (209 lb 3.2 oz), SpO2 96%.   Physical Exam  Cardiovascular:      Rate and Rhythm: Normal rate and regular rhythm.      Heart sounds: No murmur heard.  Pulmonary:      Effort: Pulmonary effort is normal. No respiratory distress.      Breath sounds: Normal breath sounds. No wheezing.   Musculoskeletal:      Right lower leg: Edema present.      Left lower leg: Edema present.      Comments: Lower extremity edema  R>L; 2+ distal pulses B/L; no skin changes or calves tenderness    Skin:     General: Skin is warm.   Neurological:      Mental Status: She is oriented to person, place, and time.         Assessment:   1. " Hypothyroidism, unspecified type   -TFT are improving  -Continue siyntroid @ 100mcg qd ( med compliance discussed)  -Repeat levels at follow up visit     2. Macrocytic anemia   -B12 wnl; folate low normal  -Initiate on daily folic acid supplement      3. SAE (acute kidney injury)   -Cr 1.39-->1.16  -States was taking lots of NSAID post her surgery  -No NSAID; OTC tylenol for pain     4. Colon cancer screening   -Cologuard ordered      5. Healthcare maintenance   -A1c/hepc/HIV ordered and reviewed  -Lipid panel with T cho 223, Trig 263 and   -ASCVD score as noted below  -No indication for statin. Recommend diet and exercise      Suspect Lower extremity edema is 2/2 venous insufficiency; continue lower extremity elevation; encouraged  compression stocks. Doppler US next visit if no significant improvement    The 10-year ASCVD risk score (Doroteo DK, et al., 2019) is: 4.2%    Values used to calculate the score:      Age: 64 years      Sex: Female      Is Non- : No      Diabetic: No      Tobacco smoker: No      Systolic Blood Pressure: 118 mmHg      Is BP treated: No      HDL Cholesterol: 45 mg/dL      Total Cholesterol: 223 mg/dL      Return to clinic in 2-3 months     Cesar Mota MD  [facility]

## 2025-05-01 ENCOUNTER — OFFICE VISIT (OUTPATIENT)
Dept: FAMILY MEDICINE | Facility: CLINIC | Age: 65
End: 2025-05-01
Payer: MEDICAID

## 2025-05-01 VITALS
HEART RATE: 73 BPM | DIASTOLIC BLOOD PRESSURE: 78 MMHG | BODY MASS INDEX: 33.62 KG/M2 | TEMPERATURE: 98 F | HEIGHT: 66 IN | WEIGHT: 209.19 LBS | SYSTOLIC BLOOD PRESSURE: 118 MMHG | RESPIRATION RATE: 18 BRPM | OXYGEN SATURATION: 96 %

## 2025-05-01 DIAGNOSIS — E03.9 HYPOTHYROIDISM, UNSPECIFIED TYPE: Primary | ICD-10-CM

## 2025-05-01 DIAGNOSIS — N17.9 AKI (ACUTE KIDNEY INJURY): ICD-10-CM

## 2025-05-01 DIAGNOSIS — D53.9 MACROCYTIC ANEMIA: ICD-10-CM

## 2025-05-01 DIAGNOSIS — Z12.11 COLON CANCER SCREENING: ICD-10-CM

## 2025-05-01 DIAGNOSIS — Z00.00 HEALTHCARE MAINTENANCE: ICD-10-CM

## 2025-05-01 LAB
ANION GAP SERPL CALC-SCNC: 8 MEQ/L
BUN SERPL-MCNC: 18.7 MG/DL (ref 9.8–20.1)
CALCIUM SERPL-MCNC: 9.2 MG/DL (ref 8.4–10.2)
CHLORIDE SERPL-SCNC: 108 MMOL/L (ref 98–107)
CHOLEST SERPL-MCNC: 223 MG/DL
CHOLEST/HDLC SERPL: 5 {RATIO} (ref 0–5)
CO2 SERPL-SCNC: 25 MMOL/L (ref 23–31)
CREAT SERPL-MCNC: 1.16 MG/DL (ref 0.55–1.02)
CREAT/UREA NIT SERPL: 16
EST. AVERAGE GLUCOSE BLD GHB EST-MCNC: 111.2 MG/DL
FOLATE SERPL-MCNC: 8.1 NG/ML (ref 7–31.4)
GFR SERPLBLD CREATININE-BSD FMLA CKD-EPI: 53 ML/MIN/1.73/M2
GLUCOSE SERPL-MCNC: 84 MG/DL (ref 82–115)
HBA1C MFR BLD: 5.5 %
HCV AB SERPL QL IA: NONREACTIVE
HDLC SERPL-MCNC: 45 MG/DL (ref 35–60)
HIV 1+2 AB+HIV1 P24 AG SERPL QL IA: NONREACTIVE
LDLC SERPL CALC-MCNC: 125 MG/DL (ref 50–140)
POTASSIUM SERPL-SCNC: 4.1 MMOL/L (ref 3.5–5.1)
SODIUM SERPL-SCNC: 141 MMOL/L (ref 136–145)
TRIGL SERPL-MCNC: 263 MG/DL (ref 37–140)
VIT B12 SERPL-MCNC: 387 PG/ML (ref 213–816)
VLDLC SERPL CALC-MCNC: 53 MG/DL

## 2025-05-01 PROCEDURE — 82607 VITAMIN B-12: CPT

## 2025-05-01 PROCEDURE — 86803 HEPATITIS C AB TEST: CPT

## 2025-05-01 PROCEDURE — 82746 ASSAY OF FOLIC ACID SERUM: CPT

## 2025-05-01 PROCEDURE — 83036 HEMOGLOBIN GLYCOSYLATED A1C: CPT

## 2025-05-01 PROCEDURE — 80048 BASIC METABOLIC PNL TOTAL CA: CPT

## 2025-05-01 PROCEDURE — 99213 OFFICE O/P EST LOW 20 MIN: CPT | Mod: PBBFAC

## 2025-05-01 PROCEDURE — 87389 HIV-1 AG W/HIV-1&-2 AB AG IA: CPT

## 2025-05-01 PROCEDURE — 80061 LIPID PANEL: CPT

## 2025-05-01 RX ORDER — LEVOTHYROXINE SODIUM 100 UG/1
100 TABLET ORAL
Qty: 30 TABLET | Refills: 4 | Status: SHIPPED | OUTPATIENT
Start: 2025-05-01

## 2025-05-01 RX ORDER — FOLIC ACID 1 MG/1
1 TABLET ORAL DAILY
Qty: 30 TABLET | Refills: 5 | Status: SHIPPED | OUTPATIENT
Start: 2025-05-01

## 2025-05-02 NOTE — PROGRESS NOTES
Faculty Attestation    I have reveiwed and agree with the resident's findings, including all diagnostic interpretations and plans as written.    Elle Velazquez MD

## 2025-06-03 ENCOUNTER — OFFICE VISIT (OUTPATIENT)
Dept: GYNECOLOGIC ONCOLOGY | Facility: CLINIC | Age: 65
End: 2025-06-03
Payer: MEDICAID

## 2025-06-03 VITALS
WEIGHT: 202.63 LBS | BODY MASS INDEX: 32.7 KG/M2 | SYSTOLIC BLOOD PRESSURE: 113 MMHG | HEART RATE: 76 BPM | DIASTOLIC BLOOD PRESSURE: 72 MMHG

## 2025-06-03 DIAGNOSIS — E03.9 HYPOTHYROIDISM, UNSPECIFIED TYPE: ICD-10-CM

## 2025-06-03 DIAGNOSIS — L90.0 LICHEN SCLEROSUS ET ATROPHICUS: ICD-10-CM

## 2025-06-03 DIAGNOSIS — Z85.40 HISTORY OF FEMALE GENITAL CANCER: Primary | ICD-10-CM

## 2025-06-03 PROCEDURE — 99459 PELVIC EXAMINATION: CPT | Mod: S$GLB,,, | Performed by: OBSTETRICS & GYNECOLOGY

## 2025-06-03 PROCEDURE — 3044F HG A1C LEVEL LT 7.0%: CPT | Mod: CPTII,S$GLB,, | Performed by: OBSTETRICS & GYNECOLOGY

## 2025-06-03 PROCEDURE — G2211 COMPLEX E/M VISIT ADD ON: HCPCS | Mod: S$GLB,,, | Performed by: OBSTETRICS & GYNECOLOGY

## 2025-06-03 PROCEDURE — 3008F BODY MASS INDEX DOCD: CPT | Mod: CPTII,S$GLB,, | Performed by: OBSTETRICS & GYNECOLOGY

## 2025-06-03 PROCEDURE — 3074F SYST BP LT 130 MM HG: CPT | Mod: CPTII,S$GLB,, | Performed by: OBSTETRICS & GYNECOLOGY

## 2025-06-03 PROCEDURE — 99213 OFFICE O/P EST LOW 20 MIN: CPT | Mod: S$GLB,,, | Performed by: OBSTETRICS & GYNECOLOGY

## 2025-06-03 PROCEDURE — 3078F DIAST BP <80 MM HG: CPT | Mod: CPTII,S$GLB,, | Performed by: OBSTETRICS & GYNECOLOGY

## 2025-06-03 PROCEDURE — 1159F MED LIST DOCD IN RCRD: CPT | Mod: CPTII,S$GLB,, | Performed by: OBSTETRICS & GYNECOLOGY

## 2025-08-07 ENCOUNTER — TELEPHONE (OUTPATIENT)
Dept: GYNECOLOGIC ONCOLOGY | Facility: CLINIC | Age: 65
End: 2025-08-07
Payer: MEDICAID

## 2025-08-07 NOTE — TELEPHONE ENCOUNTER
"Pt contacted navigator with update that he is having symptoms that the cream is not managing. Pt reports that she is using both creams (Lidocaine and Clobetasol) and they are "not working". Pt reports that she is having itching both externally and internally. Pt also reports that it hurts to wipe and it bothers her when her clothes rub against it. She does admit that the creams help but there has been no improvement. Pt requesting to be seen due to a upcoming vacation on the 20th. Pt offered appointments with both Dana BONDS and Dr Kelly's partner Dr Theresa Pugh. Pt able to come on Tuesday next week and appointment arranged with Dr Pugh for evaluation.     On a separate note, pt reports that she will make 1 year of no smoking on August 20th and she is reports she still misses it and it is hard but that she is "better off without it"  "

## 2025-08-12 ENCOUNTER — OFFICE VISIT (OUTPATIENT)
Facility: CLINIC | Age: 65
End: 2025-08-12
Payer: MEDICAID

## 2025-08-12 VITALS
SYSTOLIC BLOOD PRESSURE: 133 MMHG | WEIGHT: 208.88 LBS | HEART RATE: 72 BPM | BODY MASS INDEX: 33.57 KG/M2 | RESPIRATION RATE: 20 BRPM | HEIGHT: 66 IN | DIASTOLIC BLOOD PRESSURE: 80 MMHG

## 2025-08-12 DIAGNOSIS — C51.9 VULVAR CANCER, CARCINOMA: Primary | ICD-10-CM

## 2025-08-12 DIAGNOSIS — Z01.818 PRE-OP EVALUATION: Primary | ICD-10-CM

## 2025-08-20 ENCOUNTER — TELEPHONE (OUTPATIENT)
Dept: GYNECOLOGIC ONCOLOGY | Facility: CLINIC | Age: 65
End: 2025-08-20
Payer: MEDICAID

## 2025-08-20 DIAGNOSIS — L90.0 LICHEN SCLEROSUS ET ATROPHICUS: ICD-10-CM

## 2025-08-20 DIAGNOSIS — Z01.818 PRE-OP EVALUATION: Primary | ICD-10-CM

## 2025-08-20 DIAGNOSIS — C51.9 VULVAR CANCER, CARCINOMA: ICD-10-CM

## 2025-08-20 DIAGNOSIS — Z85.40 HISTORY OF FEMALE GENITAL CANCER: ICD-10-CM

## 2025-08-28 ENCOUNTER — TELEPHONE (OUTPATIENT)
Facility: CLINIC | Age: 65
End: 2025-08-28
Payer: MEDICAID

## (undated) DEVICE — DRAPE LEGGINGS CUFF 31X48IN

## (undated) DEVICE — TUBE SUCTION MEDI-VAC STERILE

## (undated) DEVICE — SYR IRRIGATION BULB STER 60ML

## (undated) DEVICE — CUP PROFEX GLASS GRADUATE 1OZ

## (undated) DEVICE — KIT LITHOTOMY MINOR LAFAYETTE

## (undated) DEVICE — ELECTRODE NDL EDGE 2 5/6IN

## (undated) DEVICE — SUT VICRYL 3-0 27 SH

## (undated) DEVICE — PENCIL ELECSURG ROCKER 15FT

## (undated) DEVICE — RETRACTOR LONE STAR 14.1X14.1

## (undated) DEVICE — TRAY SKIN SCRUB WET PREMIUM

## (undated) DEVICE — SUT VICRYL PLUS 3-0 SH 18IN

## (undated) DEVICE — BLADE SURG STAINLESS STEEL #15

## (undated) DEVICE — CATH RED RUBBER LATEX 14F 16IN

## (undated) DEVICE — GLOVE PROTEXIS BLUE LATEX 8

## (undated) DEVICE — GLOVE PROTEXIS BLUE LATEX 6.5

## (undated) DEVICE — SYR 10CC LUER LOCK

## (undated) DEVICE — COVER PROXIMA MAYO STAND

## (undated) DEVICE — NDL HYPO REG 25G X 1 1/2

## (undated) DEVICE — ELECTRODE PATIENT RETURN DISP

## (undated) DEVICE — PAD SANITARY HVY ABSRB UNSCNTD

## (undated) DEVICE — GLOVE PROTEXIS PI SYN SURG 6.5

## (undated) DEVICE — ELECTRODE NEEDLE 2.8IN

## (undated) DEVICE — KIT SURGICAL TURNOVER

## (undated) DEVICE — TIP SUCTION YANKAUER

## (undated) DEVICE — GLOVE PROTEXIS LTX MICRO  7.5

## (undated) DEVICE — SUT 2/0 30IN SILK BLK BRAI

## (undated) DEVICE — HOOK LONE STAR SHRP ELAS 5MM